# Patient Record
Sex: FEMALE | Race: WHITE
[De-identification: names, ages, dates, MRNs, and addresses within clinical notes are randomized per-mention and may not be internally consistent; named-entity substitution may affect disease eponyms.]

---

## 2017-04-25 ENCOUNTER — HOSPITAL ENCOUNTER (EMERGENCY)
Dept: HOSPITAL 47 - EC | Age: 52
Discharge: HOME | End: 2017-04-25
Payer: MEDICARE

## 2017-04-25 VITALS — TEMPERATURE: 98.8 F | RESPIRATION RATE: 18 BRPM

## 2017-04-25 VITALS — HEART RATE: 68 BPM

## 2017-04-25 DIAGNOSIS — Z88.8: ICD-10-CM

## 2017-04-25 DIAGNOSIS — K94.29: Primary | ICD-10-CM

## 2017-04-25 DIAGNOSIS — F32.9: ICD-10-CM

## 2017-04-25 DIAGNOSIS — G80.9: ICD-10-CM

## 2017-04-25 DIAGNOSIS — Z79.899: ICD-10-CM

## 2017-04-25 DIAGNOSIS — Z88.0: ICD-10-CM

## 2017-04-25 DIAGNOSIS — K21.9: ICD-10-CM

## 2017-04-25 PROCEDURE — 43760: CPT

## 2017-04-25 PROCEDURE — 99283 EMERGENCY DEPT VISIT LOW MDM: CPT

## 2017-04-25 NOTE — ED
Recheck HPI





- General


Chief Complaint: Recheck/Abnormal Lab/Rx


Stated Complaint: Feeding tube replacement


Time Seen by Provider: 04/25/17 19:55


Source: patient, EMS, RN notes reviewed


Mode of arrival: EMS


Limitations: no limitations





- History of Present Illness


Initial Comments: 





Patient is a 52-year-old female presents to the emergency room for evaluation 

of PEG tube replacement.  Patient has a history of cerebral palsy.  Patient's 

caregiver states that patient occasionally will pop out her PEG tube because 

she contracts her abdominal muscles so often.  Patient's caregiver states that 

patient PEG tube was originally placed in 2015 by Dr. Schultz.  Patient's 

caregiver states the patient's PEG tube is replaced every few months due to her 

PEG tube popping out.  Patient's caregiver denies any other complaints.  Patient

's caretaker states that she needs patient's PEG tube to be replaced.





- Related Data


 Home Medications











 Medication  Instructions  Recorded  Confirmed


 


Baclofen [Lioresal] 10 mg PO BID 05/19/14 04/25/17


 


Dantrolene [Dantrium] 25 mg PO QID 05/19/14 04/25/17


 


Topiramate [Topamax] 100 mg PO HS 05/19/14 04/25/17


 


Diazepam [Valium] 5 mg PO QID 05/20/14 04/25/17


 


Omeprazole [PriLOSEC] 20 mg PO BID 04/15/15 04/25/17


 


ARIPiprazole [Abilify Oral 2 mg PO BID 04/25/17 04/25/17





Solution]   


 


Baclofen [Lioresal] 20 mg PO HS 04/25/17 04/25/17


 


Benztropine Mesylate [Cogentin] 1 mg PO TID 04/25/17 04/25/17


 


Celexa Oral Solution 40 mg PO HS 04/25/17 04/25/17


 


Cholecalciferol [Vitamin D3] 2,000 unit PO DAILY 04/25/17 04/25/17


 


Diazepam [Valium] 2 mg PO TID 04/25/17 04/25/17


 


Natures Bounty Menopause 1 tab PO QAM 04/25/17 04/25/17


 


Vitamin E (Dl,Tocopheryl Acet) 800 unit PO DAILY 04/25/17 04/25/17





[Vitamin E]   











 Allergies











Allergy/AdvReac Type Severity Reaction Status Date / Time


 


amoxicillin Allergy  Rash/Hives Verified 04/25/17 20:12


 


risperidone [From Risperdal] AdvReac  Tardive Verified 04/25/17 20:12





   Dyskinesia  














Review of Systems


ROS Statement: 


Those systems with pertinent positive or pertinent negative responses have been 

documented in the HPI.





ROS Other: All systems not noted in ROS Statement are negative.





Past Medical History


Past Medical History: GERD/Reflux, Pneumonia


Additional Past Medical History / Comment(s): migraines, UTI, .CEREBRAL PALSY.


History of Any Multi-Drug Resistant Organisms: None Reported


Past Surgical History: Appendectomy, Hysterectomy


Additional Past Surgical History / Comment(s): cerebral palsy, congenital quad. 

temeors


Past Anesthesia/Blood Transfusion Reactions: No Reported Reaction


Past Psychological History: Depression


Additional Psychological History / Comment(s): pt lives at [facility] on 

[facility]. pt is total care. in last 3 months abilty to communicate has 

decreased, diff swallowing and decreased appetite. pt is total care but able to 

sit in w/c once transfered there. family was able to prompt pt w/question and 

get head gestures for responses.


Smoking Status: Never smoker


Past Alcohol Use History: None Reported


Past Drug Use History: None Reported





- Past Family History


  ** Father


Family Medical History: Asthma





  ** Mother


Family Medical History: COPD





General Exam





- General Exam Comments


Initial Comments: 





Pain exam room, no acute distress.


Limitations: no limitations


General appearance: alert, in no apparent distress


Head exam: Present: atraumatic, normocephalic, normal inspection


Eye exam: Present: normal appearance


ENT exam: Present: normal exam


Neck exam: Present: normal inspection


Respiratory exam: Present: normal lung sounds bilaterally.  Absent: respiratory 

distress


Cardiovascular Exam: Present: regular rate, normal rhythm, normal heart sounds


GI/Abdominal exam: Present: soft, normal bowel sounds, other (ostomy placed in 

RUQ where PEG tube is supposed to be placed ).  Absent: distended, tenderness, 

guarding, rebound, rigid


Extremities exam: Present: normal inspection


Back exam: Present: normal inspection


Neurological exam: Present: alert


Skin exam: Present: warm, dry, intact, normal color.  Absent: rash





Course


 Vital Signs











  04/25/17 04/25/17 04/25/17





  19:54 21:14 22:12


 


Temperature 98.8 F  


 


Pulse Rate 65 65 68


 


Respiratory 18  18





Rate   


 


O2 Sat by Pulse 95 96 96





Oximetry   














Procedures





- Feeding Tube Replacement


Reason for Replacement: fell out


Initial Tube Inserted: greater than 2 weeks


Type of Tube: gastrostomy


Use of Tube: medications and feeding


Insertion Site Prior to Procedure: clean


Tube Used for Reinsertion: Bard


Sami Tube Size (F): 16


Balloon Size (mls): 20


Verification of Placement: other (gastic fluid )


Tube Secured by: tape/dressing


Patient Tolerated Procedure: well


Complications: local bleeding





Medical Decision Making





- Medical Decision Making





Patient is a 52-year-old female presents to the emergency room for evaluation 

of feeding tube replacement.  Feeding tube was replaced successfully.  Advised 

patient caregiver to have patient follow-up with her primary care provider.  

Patient's caregiver states she understands everything that was discussed with 

her.  Return parameters discussed.  Case discussed with Dr. Neri.





Disposition


Clinical Impression: 


 Encounter for feeding tube placement





Disposition: HOME SELF-CARE


Condition: Good


Instructions:  How to Use and Care for Your PEG Tube (ED)


Additional Instructions: 


Please follow up with primary care provider in 1-2 days. If any new symptom 

arises or symptoms worsen, return to ER as soon as possible.  


Referrals: 


Austin Cruz Jr, DO [Primary Care Provider] - 1-2 days


Time of Disposition: 22:01

## 2017-05-09 ENCOUNTER — HOSPITAL ENCOUNTER (OUTPATIENT)
Dept: HOSPITAL 47 - SLEEP | Age: 52
End: 2017-05-09
Payer: MEDICARE

## 2017-05-09 DIAGNOSIS — Z79.899: ICD-10-CM

## 2017-05-09 DIAGNOSIS — G47.00: Primary | ICD-10-CM

## 2017-05-09 DIAGNOSIS — F32.9: ICD-10-CM

## 2017-05-09 DIAGNOSIS — G82.50: ICD-10-CM

## 2017-05-09 DIAGNOSIS — G80.9: ICD-10-CM

## 2017-05-09 PROCEDURE — 99204 OFFICE O/P NEW MOD 45 MIN: CPT

## 2017-05-09 PROCEDURE — 99211 OFF/OP EST MAY X REQ PHY/QHP: CPT

## 2017-05-09 NOTE — CONS
DATE OF CONSULTATION:  



REASON FOR CONSULTATION:  Insomnia.



This is a 52-year-old female patient with history of cerebral palsy 

and quadriplegia. The patient is currently living at [facility]. Approximately 3 to 4 years ago, the patient suffered from major 

depression disorder with psychotic features. During the same time, the 

patient start having locked jaw, clenching problems, jaw clenching 

problems and symptoms of tardive dyskinesia that was attributed to 

initiation of Risperidone treatment. The patient has been seen by 

various consultants, including Dr. Galindo from neurology, Dr. Alaina Jerry from [facility].   Looking at her 

medications, the patient is on a combination of medication which 

includes muscle relaxers and this include baclofen,  

(    ) and Valium and she is taking Valium 5 mg 4 times a day and 2 mg 

3 times a day. She is also on Detrol 25 mg 4 times a day, baclofen 10 

mg 1 to 4 tablets a day. Reviewing of psychiatric medications,  the 

patient is on a combination of citalopram and Abilify for depression, 

Topamax for a mood stabilizer and she is also on Benzotropine 1 mg 3 

times a day.  



It was noted that over the past year, the patient has been waking up 

in the middle of the night and she would stay awake for a few hours. 

The patient is unable to provide any history, however, she can 

communicate by using various codes and the caregivers are able to 

decipher the codes. She is quiet restlessness. She keeps moving and 

having upper extremity and trunk and neck movement even while she is 

sitting at her wheelchair.  It was noted that the patient also 

occasionally clenches her teeth and clenches her jaw.   I reviewed her 

sleep diary. It seems that the patient is able to go sleep at around 7 

to 9:00 p.m. However, she would wake up for a few hours between 

midnight and 2 to 3:00 a.m.  Does not seem to be any problem with 

sleep induction. She would wake up in the middle of the night and then 

she is able to go back to sleep following that. No reported snoring. 

No reported aspiration. The patient utilizes a feeding tube for 

enteral feeding and nutritional support. No crying episodes. She has 

chronic arthritic pain in her knee. She sleeps in the bed at 45 

degrees elevation. The main activation for now is sleep fragmentation. 

No agitation. No nocturnal seizures.  



PAST MEDICAL HISTORY: 

1. Congenital quadriplegia with cerebral palsy. 

2. Major depression disorder with severe psychotic features, treated. 

3. Questionable tardive dyskinesia related to risperidone. 

4. Locked jaw with clenching of the teeth, improved. 

5. Difficulty with swallowing and the patient has enteral feeding for 

nutritional support.  

6. Chronic right hip fracture. 



Surgical history:  Non- drug allergies are listed.



Outpatient medication list: 

1. The patient takes baclofen 10 mg  one in the morning, one at noon 

time and 2 in the evening. 

2. Ventolin 25 mg 4 times a day.

3. Omeprazole 20 mg twice a day.

4. Valium 2 mg one in the morning, one in the afternoon and one at 

nighttime. 

5. Valium 5 mg 4 times a day. 

6. She is also on Abilify 1 mg one in the morning and one in the 

evening. 

7. Citalopram 40 mg p.o. daily. 

8. Topamax 100 mg p.o. at the bedtime.

9. Benzotropine 1 mg 3 times a day.

10. Vitamin E two tablets 400 units, two tablets a day.

11. Vitamin D3, 2000 units 1 tablet a day. 

12. PEG tube feeding for nutritional support. 



SOCIAL HISTORY: She lives in a group home. No history of substance 

abuse.  



FAMILY HISTORY: Noncontributory at this stage.  Negative for any sleep 

breathing disorder.  



REVIEW OF SYSTEMS: Twelve-point review of systems cannot be obtained 

based on above mentioned limitation caused by her underlying cerebral 

palsy.  



PHYSICAL EXAMINATION: Blood pressure is 109/72, pulse 106, 

respirations 20, temperature 97.6, saturation 96% on room air. Weight 

is 101. Height is 5 foot 10. Neck size 15 inches.  

GENERAL APPEARANCE:  Calm and comfortable.  Some ongoing movement 

disorder both in the upper and lower extremities. The patient can 

sometimes communicate with a few words and she is able to state codes 

which is further deciphered by the caregivers.  

HEENT: Negative for goiter or neck masses.  Obvious stiffness in her 

neck area and jaw bilaterally along with evidence of teeth grinding. 

Posterior pharynx cannot be accurately inspected.  

LUNGS: Clear to auscultation. 

HEART: Sounds are normal S1, S2. No S3, no S4. No murmurs. 

ABDOMEN: Soft, nontender. No organomegaly. 

EXTREMITIES: No clubbing, no cyanosis, or clubbing. 



IMPRESSION:

1. Sleep maintenance insomnia. Rule out secondary to underlying 

depression/psychiatric disorder. Rule out other comorbidities, 

including the possibility of nocturnal pain, heartburn, reflux. Doubt  

obstructive sleep apnea.  Doubt any other restless leg syndrome. Doubt 

any other (    ) conditions such as nocturnal seizures or any other 

parasomnias.  

2. Cerebral palsy. 

3. (    ) quadriplegia. 

4. Questionable history of tardive dyskinesia. 

5. Difficulty with swallowing status post enteral feedings for 

nutritional support via PEG tube.  

6. Chronic pain involving the right hip related to her chronic 

fracture.  

7. Depression with psychotic features, history of. 



PLAN: I had a lengthy discussion with the family. I do not see 

evidence of any primary sleep disorder and her insomnia is most likely 

secondary to underlying neuropsychiatric condition. Depression is 

known to cause sleep fragmentation (   ) arousals and sometimes 

difficulty in sleep initiation. As mentioned, the primary sleep 

disorders are felt to be less likely in this patient. In fact, I am 

unable to complete the home sleep study because of her cerebral palsy 

and her underlying health condition.  



The treatment options that I have discussed with the patient are the 

followin. Consider addition of hypnotic agents such as Ambien 10 mg; however, 

this will likely give the patient a minimum benefit and in fact, will 

be quite slow knowing that she is taking higher doses of 

benzodiazepines and psychotropic medications. It is worthwhile (    ) 

the patient was given 10 mg of Ambien to be taken at bedtime and we 

will monitor her sleep schedule and sleep diary will be kept.  

2. Other alternatives that were discussed are the following:  

Increasing the dose of benzodiazepine and the patient is already on 

Valium and my recommendations is gradually up on the dose of Valium 

which will help her with improving anxiety levels and promote sleep. 

Increasing the nighttime Valium may be a consideration and suggesting 

treating the nighttime dose of Valium to 10 mg. This will be discussed 

further with Oralia Jerry from [facility]  to see 

if this is a viable option.  

3. One other suggestion will be the addition of Seroquel however, I 

would like to get a clearance from psychiatry prior to consider 

initiation of Seroquel which will help this patient with psychotic 

features and promote sleep.  

4. Consider the addition of trazodone at bedtime; however, this may 

interact with other medications including Abilify and citalopram and 

this will increase further the risk of serotonergic syndrome and other 

potential side effects. This option will be also discussed with 

psychiatry prior to being implemented.  

5. Other comorbidities include chronic pain that needs to be treated 

more effectively. Addition of a low dose Norco such as at a dose of 

5/325 at bedtime may help her control the pain and promote sleep.  

6. Continue omeprazole and implement aspiration precautions to provide 

any nocturnal reflux that could be potentially affecting her sleep 

quality.  

7. We will continue following up this patient. We will make further 

recommendations based on her overall progress.

## 2017-08-06 ENCOUNTER — HOSPITAL ENCOUNTER (EMERGENCY)
Dept: HOSPITAL 47 - EC | Age: 52
Discharge: HOME | End: 2017-08-06
Payer: MEDICARE

## 2017-08-06 VITALS — RESPIRATION RATE: 22 BRPM

## 2017-08-06 VITALS — TEMPERATURE: 97.3 F | HEART RATE: 59 BPM

## 2017-08-06 DIAGNOSIS — Z88.8: ICD-10-CM

## 2017-08-06 DIAGNOSIS — Z88.0: ICD-10-CM

## 2017-08-06 DIAGNOSIS — Z90.49: ICD-10-CM

## 2017-08-06 DIAGNOSIS — K21.9: ICD-10-CM

## 2017-08-06 DIAGNOSIS — Z79.899: ICD-10-CM

## 2017-08-06 DIAGNOSIS — K94.23: Primary | ICD-10-CM

## 2017-08-06 PROCEDURE — 99283 EMERGENCY DEPT VISIT LOW MDM: CPT

## 2017-08-06 PROCEDURE — 43760: CPT

## 2017-08-06 PROCEDURE — 74000: CPT

## 2017-08-06 NOTE — XR
EXAMINATION TYPE: XR KUB , ONE VIEW

 

DATE OF EXAM ORDERED: 8/6/2017

 

HISTORY: PEG tube placement.

 

COMPARISON: None.

 

FINDINGS:  Contrast injected into the patient's PEG tube. Gastric rugae are visualized. This confirms
 the presence of the tube within the stomach.

 

IMPRESSION: 

 

PEG TUBE IS NORMALLY LOCATED WITHIN THE STOMACH.

## 2017-08-06 NOTE — ED
General Adult HPI





- General


Chief complaint: Recheck/Abnormal Lab/Rx


Stated complaint: feeding tube


Time Seen by Provider: 08/06/17 11:56


Source: patient, EMS, RN notes reviewed


Mode of arrival: EMS


Limitations: altered mental status





- History of Present Illness


Initial comments: 





52-year-old female presented emergency from EMS for PEG tube displacement.  

Patient's had a feeding tube for several years PEG tube has fallen out today.  

Patient is in no distress.  No fevers or chills patient is here with caregivers.





- Related Data


 Home Medications











 Medication  Instructions  Recorded  Confirmed


 


Baclofen [Lioresal] 10 mg PO BID 05/19/14 04/25/17


 


Dantrolene [Dantrium] 25 mg PO QID 05/19/14 04/25/17


 


Topiramate [Topamax] 100 mg PO HS 05/19/14 04/25/17


 


Diazepam [Valium] 5 mg PO QID 05/20/14 04/25/17


 


Omeprazole [PriLOSEC] 20 mg PO BID 04/15/15 04/25/17


 


ARIPiprazole [Abilify Oral 2 mg PO BID 04/25/17 04/25/17





Solution]   


 


Baclofen [Lioresal] 20 mg PO HS 04/25/17 04/25/17


 


Benztropine Mesylate [Cogentin] 1 mg PO TID 04/25/17 04/25/17


 


Celexa Oral Solution 40 mg PO HS 04/25/17 04/25/17


 


Cholecalciferol [Vitamin D3] 2,000 unit PO DAILY 04/25/17 04/25/17


 


Diazepam [Valium] 2 mg PO TID 04/25/17 04/25/17


 


Natures Bounty Menopause 1 tab PO QAM 04/25/17 04/25/17


 


Vitamin E (Dl,Tocopheryl Acet) 800 unit PO DAILY 04/25/17 04/25/17





[Vitamin E]   











 Allergies











Allergy/AdvReac Type Severity Reaction Status Date / Time


 


amoxicillin Allergy  Rash/Hives Verified 04/25/17 20:12


 


risperidone [From Risperdal] AdvReac  Tardive Verified 04/25/17 20:12





   Dyskinesia  














Review of Systems


ROS Statement: 


Those systems with pertinent positive or pertinent negative responses have been 

documented in the HPI.





ROS Other: All systems not noted in ROS Statement are negative.





Past Medical History


Past Medical History: GERD/Reflux, Pneumonia


Additional Past Medical History / Comment(s): migraines, UTI, .CEREBRAL PALSY.


History of Any Multi-Drug Resistant Organisms: None Reported


Past Surgical History: Appendectomy, Hysterectomy


Additional Past Surgical History / Comment(s): cerebral palsy, congenital quad. 

temeors


Past Anesthesia/Blood Transfusion Reactions: No Reported Reaction


Past Psychological History: No Psychological Hx Reported, Depression


Smoking Status: Never smoker


Past Alcohol Use History: None Reported


Past Drug Use History: None Reported





- Past Family History


  ** Father


Family Medical History: Asthma





  ** Mother


Family Medical History: COPD





General Exam


Limitations: altered mental status


General appearance: alert, in no apparent distress


Neck exam: Present: normal inspection.  Absent: tenderness, meningismus, 

lymphadenopathy


Respiratory exam: Present: normal lung sounds bilaterally.  Absent: respiratory 

distress, wheezes, rales, rhonchi, stridor


Cardiovascular Exam: Present: regular rate, normal rhythm, normal heart sounds.

  Absent: systolic murmur, diastolic murmur, rubs, gallop, clicks


GI/Abdominal exam: Present: soft, normal bowel sounds, other (Opening noted for 

PEG tube with no erythema no purulent drainage).  Absent: distended, tenderness

, guarding, rebound, rigid





Course


 Vital Signs











  08/06/17





  11:46


 


Temperature 97.1 F L


 


Pulse Rate 54 L


 


Respiratory 22





Rate 


 


O2 Sat by Pulse 95





Oximetry 














Procedures





- Procedures


Initial comment: 





PEG tube placement: #20-Serbian PEG tube was placed with no complications 20 mL 

of saline were completed into the balloon Estephania was performed.





Disposition


Clinical Impression: 


 Dislodged gastrostomy tube, PEG tube malfunction





Disposition: HOME SELF-CARE


Condition: Stable


Instructions:  Percutaneous Endoscopic Gastrostomy (ED)


Additional Instructions: 


Please return to the Emergency Department if symptoms worsen or any other 

concerns.


Referrals: 


Austin Cruz Jr, DO [Primary Care Provider] - 1-2 days

## 2018-02-07 ENCOUNTER — HOSPITAL ENCOUNTER (EMERGENCY)
Dept: HOSPITAL 47 - EC | Age: 53
Discharge: HOME | End: 2018-02-07
Payer: MEDICARE

## 2018-02-07 VITALS — HEART RATE: 88 BPM | TEMPERATURE: 97.5 F | RESPIRATION RATE: 24 BRPM

## 2018-02-07 DIAGNOSIS — R41.82: ICD-10-CM

## 2018-02-07 DIAGNOSIS — Z79.899: ICD-10-CM

## 2018-02-07 DIAGNOSIS — Z88.0: ICD-10-CM

## 2018-02-07 DIAGNOSIS — Z88.8: ICD-10-CM

## 2018-02-07 DIAGNOSIS — F32.9: ICD-10-CM

## 2018-02-07 DIAGNOSIS — Z86.69: ICD-10-CM

## 2018-02-07 DIAGNOSIS — G80.9: ICD-10-CM

## 2018-02-07 DIAGNOSIS — K94.23: Primary | ICD-10-CM

## 2018-02-07 DIAGNOSIS — K21.9: ICD-10-CM

## 2018-02-07 PROCEDURE — 43760: CPT

## 2018-02-07 PROCEDURE — 99283 EMERGENCY DEPT VISIT LOW MDM: CPT

## 2018-02-07 PROCEDURE — 74018 RADEX ABDOMEN 1 VIEW: CPT

## 2018-02-07 NOTE — ED
General Adult HPI





- General


Chief complaint: Recheck/Abnormal Lab/Rx


Stated complaint: Peg Tube issues


Time Seen by Provider: 02/07/18 11:51


Source: patient, RN notes reviewed, Caregiver


Mode of arrival: wheelchair


Limitations: altered mental status, physical limitation





- History of Present Illness


Initial comments: 





This a 53-year-old female presents with caregiver for PEG tube complications.  

They reported her PEG tube is sliding out.  They noted noticed that seen at the 

bulb has ruptured.  She's had this happen several times in the past.  Patient 

is nonverbal, has history of cerebral palsy.  Caregiver reports no other 

complaints at this time.





- Related Data


 Home Medications











 Medication  Instructions  Recorded  Confirmed


 


Dantrolene [Dantrium] 25 mg PO QID 05/19/14 02/07/18


 


Topiramate [Topamax] 100 mg PO HS 05/19/14 02/07/18


 


Diazepam [Valium] 5 mg PO QID 05/20/14 02/07/18


 


Omeprazole [PriLOSEC] 20 mg PO BID 04/15/15 02/07/18


 


ARIPiprazole [Abilify Oral 2 mg PO BID 04/25/17 02/07/18





Solution]   


 


Baclofen [Lioresal] 20 mg PO TID 04/25/17 02/07/18


 


Benztropine Mesylate [Cogentin] 1 mg PO TID 04/25/17 02/07/18


 


Cholecalciferol [Vitamin D3] 2,000 unit PO DAILY 04/25/17 02/07/18


 


Diazepam [Valium] 2 mg PO TID 04/25/17 02/07/18


 


Vitamin E (Dl,Tocopheryl Acet) 800 unit PO DAILY 04/25/17 02/07/18





[Vitamin E]   


 


Docusate [Colace] 100 mg PO BID 11/08/17 02/07/18


 


Citalopram Hydrobromide [CeleXA] 40 mg PO HS 02/07/18 02/07/18











 Allergies











Allergy/AdvReac Type Severity Reaction Status Date / Time


 


amoxicillin Allergy  Rash/Hives Verified 02/07/18 11:52


 


risperidone [From Risperdal] AdvReac  Tardive Verified 02/07/18 11:52





   Dyskinesia  














Review of Systems


ROS Statement: 


Those systems with pertinent positive or pertinent negative responses have been 

documented in the HPI.





ROS Other: All systems not noted in ROS Statement are negative.





Past Medical History


Past Medical History: GERD/Reflux, Pneumonia


Additional Past Medical History / Comment(s): migraines, UTI, .CEREBRAL PALSY.


History of Any Multi-Drug Resistant Organisms: None Reported


Past Surgical History: Appendectomy, Hysterectomy


Additional Past Surgical History / Comment(s): cerebral palsy, congenital quad. 

temeors


Past Anesthesia/Blood Transfusion Reactions: No Reported Reaction


Past Psychological History: No Psychological Hx Reported, Depression


Smoking Status: Never smoker


Past Alcohol Use History: None Reported


Past Drug Use History: None Reported





- Past Family History


  ** Father


Family Medical History: Asthma





  ** Mother


Family Medical History: COPD





General Exam


Limitations: altered mental status, physical limitation


General appearance: alert, in no apparent distress


Respiratory exam: Present: normal lung sounds bilaterally.  Absent: respiratory 

distress, wheezes, rales, rhonchi, stridor


Cardiovascular Exam: Present: regular rate, normal rhythm, normal heart sounds.

  Absent: systolic murmur, diastolic murmur, rubs, gallop, clicks


GI/Abdominal exam: Present: soft, normal bowel sounds, other (PEG tube noted, 

appears to be dislodged).  Absent: distended, tenderness, guarding, rebound, 

rigid





Course


 Vital Signs











  02/07/18





  11:34


 


Temperature 97.5 F L


 


Pulse Rate 88


 


Respiratory 24





Rate 














Procedures





- Feeding Tube Replacement


Reason for Replacement: fell out


Initial Tube Inserted: greater than 2 weeks (Several years)


Type of Tube: gastrostomy


Use of Tube: medications and feeding


Insertion Site Prior to Procedure: clean


Tube Used for Reinsertion: other (20-[place] gastric tube)


[place] Tube Size (F): 20


Balloon Size (mls): 20


Verification of Placement: gastrografin injection


Tube Secured by: tape/dressing


Patient Tolerated Procedure: well, no complications





Medical Decision Making





- Medical Decision Making





53-year-old female presented for PEG tube dislodgment.  Patient's PEG tube was 

replaced Gastrografin x-ray was obtained which appears satisfaction of 

placement.  Patient will be discharged at this time.





Disposition


Clinical Impression: 


 PEG tube malfunction





Disposition: HOME SELF-CARE


Condition: Stable


Instructions:  Percutaneous Endoscopic Gastrostomy (ED)


Additional Instructions: 


Please return to the Emergency Department if symptoms worsen or any other 

concerns.


Referrals: 


Austin Cruz Jr,  [Primary Care Provider] - 1-2 days


Time of Disposition: 12:47

## 2018-02-07 NOTE — XR
EXAMINATION TYPE: XR KUB

 

DATE OF EXAM: 2/7/2018 12:43 PM

 

CLINICAL HISTORY:  PEG tube placement

 

TECHNIQUE: Single supine KUB image of the abdomen is obtained.

 

COMPARISON: 11/8/2017.

 

FINDINGS: 20 cc of Omnipaque 350 was injected into the percutaneous enteric gastric tube ensuring nick
ropriate placement of the tube. Post instillation imaging demonstrates contrast within the gastric muna
men and proximal small bowel. Gaseous distended large bowel loops are seen decreased from the prior e
xam as well as extensive degenerative changes of the right femoral acetabular joint.

 

IMPRESSION:

1. Appropriately placed percutaneous enteric gastric tube.

2. 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

## 2018-04-13 ENCOUNTER — HOSPITAL ENCOUNTER (EMERGENCY)
Dept: HOSPITAL 47 - EC | Age: 53
Discharge: HOME | End: 2018-04-13
Payer: MEDICARE

## 2018-04-13 VITALS — DIASTOLIC BLOOD PRESSURE: 100 MMHG | HEART RATE: 75 BPM | SYSTOLIC BLOOD PRESSURE: 150 MMHG | RESPIRATION RATE: 16 BRPM

## 2018-04-13 DIAGNOSIS — K94.23: ICD-10-CM

## 2018-04-13 DIAGNOSIS — Z79.899: ICD-10-CM

## 2018-04-13 DIAGNOSIS — G43.909: ICD-10-CM

## 2018-04-13 DIAGNOSIS — Z88.0: ICD-10-CM

## 2018-04-13 DIAGNOSIS — G24.01: Primary | ICD-10-CM

## 2018-04-13 DIAGNOSIS — K21.9: ICD-10-CM

## 2018-04-13 DIAGNOSIS — Z88.8: ICD-10-CM

## 2018-04-13 PROCEDURE — 43760: CPT

## 2018-04-13 PROCEDURE — 99284 EMERGENCY DEPT VISIT MOD MDM: CPT

## 2018-04-13 NOTE — ED
General Adult HPI





- General


Chief complaint: Recheck/Abnormal Lab/Rx


Stated complaint: Peg tube issue


Time Seen by Provider: 04/13/18 07:20


Source: EMS, RN notes reviewed, old records reviewed


Mode of arrival: EMS


Limitations: language barrier, altered mental status, physical limitation





- History of Present Illness


Initial comments: 





This is a 53-year-old female the ER for evaluation.  She was essay for 

evaluation regards to PEG tube dislodgment.  G-tube dislodgment.  Patient has 

had G-tube for multiple years and has recurrent dislocations.  Patient is a 

poor historian history obtained by patient's provider.  Patient was under 

extreme stress and to rest today due to came dislodged





- Related Data


 Home Medications











 Medication  Instructions  Recorded  Confirmed


 


Dantrolene [Dantrium] 25 mg PEG/G-TUBE QID 05/19/14 04/13/18


 


Topiramate [Topamax] 100 mg PEG/G-TUBE HS 05/19/14 04/13/18


 


Diazepam [Valium] 5 mg PEG/G-TUBE QID 05/20/14 04/13/18


 


Omeprazole [PriLOSEC] 20 mg PEG/G-TUBE BID 04/15/15 04/13/18


 


ARIPiprazole [Abilify Oral 2 mg PEG/G-TUBE BID 04/25/17 04/13/18





Solution]   


 


Baclofen [Lioresal] 20 mg PEG/G-TUBE TID 04/25/17 04/13/18


 


Benztropine Mesylate [Cogentin] 1 mg PEG/G-TUBE TID 04/25/17 04/13/18


 


Cholecalciferol [Vitamin D3] 2,000 unit PEG/G-TUBE DAILY 04/25/17 04/13/18


 


Diazepam [Valium] 2 mg PEG/G-TUBE TID@0800,1800,2100 04/25/17 04/13/18


 


Vitamin E (Dl,Tocopheryl Acet) 800 unit PEG/G-TUBE DAILY@1200 04/25/17 04/13/18





[Vitamin E]   


 


Docusate [Colace] 100 mg PEG/G-TUBE BID 11/08/17 04/13/18


 


Citalopram Hydrobromide [CeleXA] 40 mg PEG/G-TUBE HS 02/07/18 04/13/18











 Allergies











Allergy/AdvReac Type Severity Reaction Status Date / Time


 


amoxicillin Allergy  Rash/Hives Verified 04/13/18 07:59


 


risperidone [From Risperdal] AdvReac  Tardive Verified 04/13/18 07:59





   Dyskinesia  














Review of Systems


ROS Statement: 


Those systems with pertinent positive or pertinent negative responses have been 

documented in the HPI.





ROS Other: All systems not noted in ROS Statement are negative.





Past Medical History


Past Medical History: GERD/Reflux, Pneumonia


Additional Past Medical History / Comment(s): migraines, UTI, .CEREBRAL PALSY.


History of Any Multi-Drug Resistant Organisms: None Reported


Past Surgical History: Appendectomy, Hysterectomy


Additional Past Surgical History / Comment(s): cerebral palsy, congenital quad. 

temeors


Past Anesthesia/Blood Transfusion Reactions: No Reported Reaction


Past Psychological History: No Psychological Hx Reported, Depression


Smoking Status: Never smoker


Past Alcohol Use History: None Reported


Past Drug Use History: None Reported





- Past Family History


  ** Father


Family Medical History: Asthma





  ** Mother


Family Medical History: COPD





General Exam





- General Exam Comments


Initial Comments: 





Colostomy site with mild bleeding


Limitations: language barrier, altered mental status, physical limitation


General appearance: alert, in no apparent distress


Head exam: Present: atraumatic, normocephalic, normal inspection


Eye exam: Present: normal appearance, PERRL, EOMI.  Absent: scleral icterus, 

conjunctival injection, periorbital swelling


ENT exam: Present: normal exam, mucous membranes moist


Neck exam: Present: normal inspection.  Absent: tenderness, meningismus, 

lymphadenopathy


Respiratory exam: Present: normal lung sounds bilaterally.  Absent: respiratory 

distress, wheezes, rales, rhonchi, stridor


Cardiovascular Exam: Present: regular rate, normal rhythm, normal heart sounds.

  Absent: systolic murmur, diastolic murmur, rubs, gallop, clicks


GI/Abdominal exam: Present: soft, normal bowel sounds.  Absent: distended, 

tenderness, guarding, rebound, rigid


Extremities exam: Present: normal inspection, full ROM, normal capillary 

refill.  Absent: tenderness, pedal edema, joint swelling, calf tenderness


Back exam: Present: normal inspection


Neurological exam: Present: alert, oriented X3, CN II-XII intact


Psychiatric exam: Present: normal affect, normal mood


Skin exam: Present: warm, dry, intact, normal color.  Absent: rash





Course





 Vital Signs











  04/13/18 04/13/18





  06:14 06:52


 


Temperature 97.5 F L 


 


Pulse Rate 64 78


 


Blood Pressure  180/88


 


O2 Sat by Pulse 99 





Oximetry  














- Reevaluation(s)


Reevaluation #1: 





04/13/18 09:01


Failed to reintroduce 20-Romanian G-tube, attempt made with 22-Romanian





Medical Decision Making





- Medical Decision Making





53 female DEL with PEG tube dislodgment G-tube dislodgment.  Tube is replaced, 

patient to be discharged





Disposition


Clinical Impression: 


 Tardive dyskinesia, Gastrostomy tube dysfunction





Disposition: HOME SELF-CARE


Condition: Good


Instructions:  Percutaneous Endoscopic Gastrostomy (ED)


Referrals: 


Austin Cruz Jr, DO [Primary Care Provider] - 1-2 days

## 2018-07-06 ENCOUNTER — HOSPITAL ENCOUNTER (EMERGENCY)
Dept: HOSPITAL 47 - EC | Age: 53
Discharge: HOME | End: 2018-07-06
Payer: MEDICARE

## 2018-07-06 VITALS — RESPIRATION RATE: 20 BRPM | TEMPERATURE: 99.4 F

## 2018-07-06 VITALS — SYSTOLIC BLOOD PRESSURE: 159 MMHG | DIASTOLIC BLOOD PRESSURE: 105 MMHG

## 2018-07-06 VITALS — HEART RATE: 72 BPM

## 2018-07-06 DIAGNOSIS — Z88.0: ICD-10-CM

## 2018-07-06 DIAGNOSIS — Z79.899: ICD-10-CM

## 2018-07-06 DIAGNOSIS — K59.00: Primary | ICD-10-CM

## 2018-07-06 DIAGNOSIS — Z90.710: ICD-10-CM

## 2018-07-06 DIAGNOSIS — K21.9: ICD-10-CM

## 2018-07-06 DIAGNOSIS — G43.909: ICD-10-CM

## 2018-07-06 DIAGNOSIS — Z90.49: ICD-10-CM

## 2018-07-06 DIAGNOSIS — Z88.8: ICD-10-CM

## 2018-07-06 PROCEDURE — 99284 EMERGENCY DEPT VISIT MOD MDM: CPT

## 2018-07-06 PROCEDURE — 74018 RADEX ABDOMEN 1 VIEW: CPT

## 2018-07-06 NOTE — ED
Abdominal Pain HPI





- General


Chief Complaint: Abdominal Pain


Stated Complaint: Constipated


Time Seen by Provider: 07/06/18 20:37


Source: Caregiver


Mode of arrival: wheelchair


Limitations: no limitations





- History of Present Illness


Initial Comments: 





Patient is a 53-year-old female that is nonverbal secondary to cerebral palsy 

and presents with the patient's caregiver.  Caregiver states that the patient 

has not had a bowel movement in the last 7 days.  However, the patient is able 

to express when she is having discomfort and she is not currently expressing 

anything to suggest that she is having pain.  Caregiver also states the patient 

is not been having any fevers or chills or vomiting.  They did try to 

suppositories and enema but this is not helping his symptoms.





- Related Data


 Home Medications











 Medication  Instructions  Recorded  Confirmed


 


Dantrolene [Dantrium] 25 mg PEG/G-TUBE QID 05/19/14 07/06/18


 


Topiramate [Topamax] 100 mg PEG/G-TUBE HS 05/19/14 07/06/18


 


Diazepam [Valium] 5 mg PEG/G-TUBE QID 05/20/14 07/06/18


 


Omeprazole [PriLOSEC] 20 mg PEG/G-TUBE BID 04/15/15 07/06/18


 


ARIPiprazole [Abilify Oral 2 mg PEG/G-TUBE BID 04/25/17 07/06/18





Solution]   


 


Baclofen [Lioresal] 20 mg PEG/G-TUBE TID 04/25/17 07/06/18


 


Benztropine Mesylate [Cogentin] 1 mg PEG/G-TUBE TID 04/25/17 07/06/18


 


Cholecalciferol [Vitamin D3] 2,000 unit PEG/G-TUBE DAILY 04/25/17 07/06/18


 


Diazepam [Valium] 2 mg PEG/G-TUBE TID@0800,1800,2100 04/25/17 07/06/18


 


Vitamin E (Dl,Tocopheryl Acet) 800 unit PEG/G-TUBE DAILY@1200 04/25/17 07/06/18





[Vitamin E]   


 


Docusate [Colace] 100 mg PEG/G-TUBE BID 11/08/17 07/06/18


 


Citalopram Hydrobromide [CeleXA] 40 mg PEG/G-TUBE HS 02/07/18 07/06/18











 Allergies











Allergy/AdvReac Type Severity Reaction Status Date / Time


 


amoxicillin Allergy  Rash/Hives Verified 07/06/18 20:51


 


risperidone [From Risperdal] AdvReac  Tardive Verified 07/06/18 20:51





   Dyskinesia  














Review of Systems


ROS Statement: 


Those systems with pertinent positive or pertinent negative responses have been 

documented in the HPI.


Unable to obtained as the patient is nonverbal.


ROS Other: All systems not noted in ROS Statement are negative.





Past Medical History


Past Medical History: GERD/Reflux, Pneumonia


Additional Past Medical History / Comment(s): migraines, UTI, .CEREBRAL PALSY. 

PEG TUBE


History of Any Multi-Drug Resistant Organisms: None Reported


Past Surgical History: Appendectomy, Hysterectomy


Additional Past Surgical History / Comment(s): cerebral palsy, congenital quad. 

temeors


Past Anesthesia/Blood Transfusion Reactions: No Reported Reaction


Past Psychological History: No Psychological Hx Reported, Depression


Smoking Status: Never smoker


Past Alcohol Use History: None Reported


Past Drug Use History: None Reported





- Past Family History


  ** Father


Family Medical History: Asthma





  ** Mother


Family Medical History: COPD





General Exam





- General Exam Comments


Initial Comments: 





Constitutional: Patient is alert but unable to obtain a and O status secondary 

to CP. No distress.





HENT:





Head: Normocephalic and atraumatic.





Eyes: EOM are normal.





Neck: Normal range of motion. Neck supple.





Cardiovascular: Normal rate, regular rhythm, S1 normal, S2 normal and normal 

heart sounds.  Exam reveals no gallop and no friction rub. No murmur heard.





Pulmonary/Chest: Effort normal and breath sounds normal. No tachypnea and no 

bradypnea. No respiratory distress. No wheezes or rales noted.





Abdominal: Soft. Bowel sounds are normal. Pt exhibits no shifting dullness, no 

distension, no pulsatile liver, no fluid wave, no abdominal bruit and no 

ascites. There is no tenderness. There is no rigidity, no rebound, no guarding, 

no tenderness at McBurney's point and negative Patton's sign.





Musculoskeletal: Diffuse extremity contractures secondary to cerebral palsy





Neurological: Pt is alertNo cranial nerve deficit.





Skin: Skin is warm and dry. No rash noted. Pt is not diaphoretic. No erythema. 

No pallor.





Psychiatric: Unable to obtain secondary to cerebral palsy and patient's 

nonverbal status.


Limitations: no limitations





Course


 Vital Signs











  07/06/18 07/06/18 07/06/18





  20:23 21:46 22:59


 


Temperature 99.4 F  


 


Pulse Rate 66  72


 


Respiratory 20  20





Rate   


 


Blood Pressure  159/105 


 


O2 Sat by Pulse 97  97





Oximetry   














Medical Decision Making





- Medical Decision Making





KUB was performed and showed that there was possible constipation which is not 

significantly changed from the prior imaging.  Brother was now bedside and the 

utility of CAT scan was discussed.  His mutually agreed that based on the 

physical exam findings as well as the lack of symptoms that the patient was 

having, CT could be deferred.  Serial abdominal exams revealed that the patient 

did not elicit any signs of pain when palpated and patient was able to respond 

by nodding her head and performing verbal cues and asked that there is pain in 

her responses were consistent with her not having any pain.  Because of this, 

it was felt that the patient could be treated medically and therefore was given 

magnesium citrate, Dulcolax and lactulose.  Family and caregiver were strongly 

advised to follow up with PCP in the next 1-2 days and/or return to emergency 

department if symptoms worsened or continue.  They were agreeable to plan.





Disposition


Clinical Impression: 


 Constipation





Disposition: HOME SELF-CARE


Condition: Good


Instructions:  Constipation (ED)


Is patient prescribed a controlled substance at d/c from ED?: No


Referrals: 


Austin Cruz Jr, DO [Primary Care Provider] - 1-2 days


Time of Disposition: 22:34

## 2018-07-06 NOTE — XR
EXAMINATION TYPE: XR KUB

 

DATE OF EXAM: 7/6/2018

 

COMPARISON: 2/7/2018

 

HISTORY: Constipation. PEG tube

 

TECHNIQUE: 2 views

 

FINDINGS: There is a peg tube that appears to be in good position over the stomach. There is no sign 
of intestinal obstruction or pneumoperitoneum. There is some retained fecal material in the right and
 left colon.. There is significant arthritic change at the right hip joint. There is no evidence of a
 mass.

 

IMPRESSION: Nonacute abdomen. There is probably some constipation. This is the same or worse than old
 exam.

## 2018-10-04 ENCOUNTER — HOSPITAL ENCOUNTER (OUTPATIENT)
Dept: HOSPITAL 47 - LABWHC1 | Age: 53
Discharge: HOME | End: 2018-10-04
Attending: PSYCHIATRY & NEUROLOGY
Payer: MEDICARE

## 2018-10-04 DIAGNOSIS — G43.709: ICD-10-CM

## 2018-10-04 DIAGNOSIS — G80.9: Primary | ICD-10-CM

## 2018-10-04 LAB
ALBUMIN SERPL-MCNC: 4.4 G/DL (ref 3.5–5)
ALP SERPL-CCNC: 121 U/L (ref 38–126)
ALT SERPL-CCNC: 26 U/L (ref 9–52)
ANION GAP SERPL CALC-SCNC: 7 MMOL/L
AST SERPL-CCNC: 24 U/L (ref 14–36)
BUN SERPL-SCNC: 23 MG/DL (ref 7–17)
CALCIUM SPEC-MCNC: 10.3 MG/DL (ref 8.4–10.2)
CHLORIDE SERPL-SCNC: 110 MMOL/L (ref 98–107)
CO2 SERPL-SCNC: 27 MMOL/L (ref 22–30)
ERYTHROCYTE [DISTWIDTH] IN BLOOD BY AUTOMATED COUNT: 4.45 M/UL (ref 3.8–5.4)
ERYTHROCYTE [DISTWIDTH] IN BLOOD: 13.5 % (ref 11.5–15.5)
GLUCOSE SERPL-MCNC: 88 MG/DL (ref 74–99)
HCT VFR BLD AUTO: 45.5 % (ref 34–46)
HGB BLD-MCNC: 14 GM/DL (ref 11.4–16)
MCH RBC QN AUTO: 31.5 PG (ref 25–35)
MCHC RBC AUTO-ENTMCNC: 30.8 G/DL (ref 31–37)
MCV RBC AUTO: 102.3 FL (ref 80–100)
PLATELET # BLD AUTO: 178 K/UL (ref 150–450)
POTASSIUM SERPL-SCNC: 4.3 MMOL/L (ref 3.5–5.1)
PROT SERPL-MCNC: 7.6 G/DL (ref 6.3–8.2)
SODIUM SERPL-SCNC: 144 MMOL/L (ref 137–145)
T4 FREE SERPL-MCNC: 1.1 NG/DL (ref 0.78–2.19)
VIT B12 SERPL-MCNC: 969 PG/ML (ref 200–944)
WBC # BLD AUTO: 5.4 K/UL (ref 3.8–10.6)

## 2018-10-04 PROCEDURE — 80053 COMPREHEN METABOLIC PANEL: CPT

## 2018-10-04 PROCEDURE — 36415 COLL VENOUS BLD VENIPUNCTURE: CPT

## 2018-10-04 PROCEDURE — 82306 VITAMIN D 25 HYDROXY: CPT

## 2018-10-04 PROCEDURE — 84443 ASSAY THYROID STIM HORMONE: CPT

## 2018-10-04 PROCEDURE — 82607 VITAMIN B-12: CPT

## 2018-10-04 PROCEDURE — 85027 COMPLETE CBC AUTOMATED: CPT

## 2018-10-04 PROCEDURE — 84439 ASSAY OF FREE THYROXINE: CPT

## 2019-09-28 ENCOUNTER — HOSPITAL ENCOUNTER (OUTPATIENT)
Dept: HOSPITAL 47 - RADXRMAIN | Age: 54
Discharge: HOME | End: 2019-09-28
Attending: FAMILY MEDICINE
Payer: MEDICARE

## 2019-09-28 DIAGNOSIS — K59.39: Primary | ICD-10-CM

## 2019-09-28 DIAGNOSIS — Z93.1: ICD-10-CM

## 2019-09-28 PROCEDURE — 74022 RADEX COMPL AQT ABD SERIES: CPT

## 2019-09-28 NOTE — XR
EXAMINATION TYPE: XR abdomen acute w cxr , 3 VIEWS

 

DATE OF EXAM ORDERED: 9/28/2019

 

HISTORY: POSS PEG TUBE  DISLODGED.

 

COMPARISON: None.

 

FINDINGS:  There is elevation of the right hemidiaphragm and there is colonic interposition bilateral
ly. The lung bases are otherwise clear. The patient's pancreatic tube projects over the mid abdomen. 
There is a dilated loop of what appears to be colon in the midabdomen. No evidence of free air. There
 is marked degenerative change right hip.

 

IMPRESSION: 

1. PEG TUBE PROJECTS OVER THE MID ABDOMEN.

2. DILATED LOOP OF COLON IN THE MIDABDOMEN ON THE SUPINE VIEW.

## 2019-10-16 ENCOUNTER — HOSPITAL ENCOUNTER (OUTPATIENT)
Dept: HOSPITAL 47 - RADUSWWP | Age: 54
Discharge: HOME | End: 2019-10-16
Attending: INTERNAL MEDICINE
Payer: MEDICARE

## 2019-10-16 DIAGNOSIS — R10.9: ICD-10-CM

## 2019-10-16 DIAGNOSIS — N20.0: Primary | ICD-10-CM

## 2019-10-16 PROCEDURE — 76700 US EXAM ABDOM COMPLETE: CPT

## 2019-10-16 PROCEDURE — 49465 FLUORO EXAM OF G/COLON TUBE: CPT

## 2019-10-16 NOTE — US
EXAMINATION TYPE: US abdomen complete

 

DATE OF EXAM: 10/16/2019

 

COMPARISON: NONE

 

CLINICAL HISTORY: R10.9 abd pain. nonverbal cerebral palsy patient that was in constant motion with u
pper body and arms, caregiver stated abd pain, patient had peg tube in place

 

EXAM MEASUREMENTS:

 

Liver Length:  13.6 cm   

Gallbladder Wall:  0.2 cm   

CBD:  0.4 cm

Spleen:  7.4 cm   

Right Kidney:  8.4 x 3.8 x 4.2 cm 

Left Kidney:  9.5 x 3.9 x 5.6 cm   

 

**very limited exam due to motion of patient and inability to hold still, excessive bowel gas

 

Pancreas:  not seen, obscured by bowel gas

Liver:  left lobe not seen, limited intercostal imaging of right lobe appear wnl

Gallbladder:  fold seen, portion seen appears wnl

**Evidence for sonographic Patton's sign:  unknown

CBD:  wnl 

Spleen:  wnl   

Right Kidney:  limited views show probable medial stones = 1.9cm    

Left Kidney:  wnl   

Upper IVC:  wnl  

Abd Aorta:  unable to visualize due to reasons stated above

 

 

 

IMPRESSION: 

1. Nonobstructing right renal stone.

2. There is limitation of this examination.

## 2019-10-16 NOTE — FL
EXAMINATION TYPE: FL feeding tube

 

DATE OF EXAM: 10/16/2019

 

COMPARISON: None

 

HISTORY: Abdomen pain, R 10.9

 

TECHNIQUE: Fluoroscopy is performed during the injection of water-soluble contrast through a PEG tube
.

 

FINDINGS: Contrast enters the stomach. No extravasation is evident.

 

30 mL of Isovue-370 was utilized.

15 seconds of fluoroscopy time was utilized.

Images: 5

 

IMPRESSION:

1.  No extravasation of contrast through the PEG tube which empties into the stomach.

## 2020-02-02 ENCOUNTER — HOSPITAL ENCOUNTER (EMERGENCY)
Dept: HOSPITAL 47 - EC | Age: 55
Discharge: HOME | End: 2020-02-02
Payer: MEDICARE

## 2020-02-02 VITALS — TEMPERATURE: 97.5 F | HEART RATE: 75 BPM | RESPIRATION RATE: 18 BRPM

## 2020-02-02 DIAGNOSIS — K21.9: ICD-10-CM

## 2020-02-02 DIAGNOSIS — F32.9: ICD-10-CM

## 2020-02-02 DIAGNOSIS — Z79.899: ICD-10-CM

## 2020-02-02 DIAGNOSIS — G25.9: ICD-10-CM

## 2020-02-02 DIAGNOSIS — K94.23: Primary | ICD-10-CM

## 2020-02-02 DIAGNOSIS — G80.9: ICD-10-CM

## 2020-02-02 DIAGNOSIS — Z88.0: ICD-10-CM

## 2020-02-02 DIAGNOSIS — Z88.8: ICD-10-CM

## 2020-02-02 DIAGNOSIS — Z99.3: ICD-10-CM

## 2020-02-02 PROCEDURE — 99282 EMERGENCY DEPT VISIT SF MDM: CPT

## 2020-02-02 PROCEDURE — 43762 RPLC GTUBE NO REVJ TRC: CPT

## 2020-02-02 NOTE — ED
General Adult HPI





- General


Chief complaint: Recheck/Abnormal Lab/Rx


Stated complaint: Peg Tube Issue


Time Seen by Provider: 02/02/20 06:06


Source: patient


Mode of arrival: ambulatory


Limitations: physical limitation





- History of Present Illness


Initial comments: 


Ginny is a nonverbal 55-year-old female who is brought to the ER this morning 

after accidentally removing her PEG tube around 4:30 this morning.  PEG tube is 

in place for a number of years and she tends to pull it out about once every 8-

12 months.  Feedings were going well yesterday.  Patient is no acute distress 

there is no bleeding from the tube site.








- Related Data


                                Home Medications











 Medication  Instructions  Recorded  Confirmed


 


Dantrolene [Dantrium] 25 mg PEG/G-TUBE QID 05/19/14 07/06/18


 


Topiramate [Topamax] 100 mg PEG/G-TUBE HS 05/19/14 07/06/18


 


Diazepam [Valium] 5 mg PEG/G-TUBE QID 05/20/14 07/06/18


 


Omeprazole [PriLOSEC] 20 mg PEG/G-TUBE BID 04/15/15 07/06/18


 


ARIPiprazole [Abilify Oral 2 mg PEG/G-TUBE BID 04/25/17 07/06/18





Solution]   


 


Baclofen [Lioresal] 20 mg PEG/G-TUBE TID 04/25/17 07/06/18


 


Benztropine Mesylate [Cogentin] 1 mg PEG/G-TUBE TID 04/25/17 07/06/18


 


Cholecalciferol [Vitamin D3] 2,000 unit PEG/G-TUBE DAILY 04/25/17 07/06/18


 


Diazepam [Valium] 2 mg PEG/G-TUBE TID@0800,1800,2100 04/25/17 07/06/18


 


Vitamin E (Dl,Tocopheryl Acet) 800 unit PEG/G-TUBE DAILY@1200 04/25/17 07/06/18





[Vitamin E]   


 


Docusate [Colace] 100 mg PEG/G-TUBE BID 11/08/17 07/06/18


 


Citalopram Hydrobromide [CeleXA] 40 mg PEG/G-TUBE HS 02/07/18 07/06/18











                                    Allergies











Allergy/AdvReac Type Severity Reaction Status Date / Time


 


amoxicillin Allergy  Rash/Hives Verified 02/02/20 05:58


 


risperidone [From Risperdal] AdvReac  Tardive Verified 02/02/20 05:58





   Dyskinesia  














Review of Systems


ROS Statement: 


Those systems with pertinent positive or pertinent negative responses have been 

documented in the HPI.





ROS Other: All systems not noted in ROS Statement are negative.





Past Medical History


Past Medical History: GERD/Reflux, Pneumonia


Additional Past Medical History / Comment(s): migraines, UTI, .CEREBRAL PALSY. 

PEG TUBE


History of Any Multi-Drug Resistant Organisms: None Reported


Past Surgical History: Appendectomy, Hysterectomy


Additional Past Surgical History / Comment(s): cerebral palsy, congenital quad. 

temeors


Past Anesthesia/Blood Transfusion Reactions: No Reported Reaction


Past Psychological History: No Psychological Hx Reported, Depression


Smoking Status: Never smoker


Past Alcohol Use History: None Reported


Past Drug Use History: None Reported





- Past Family History


  ** Father


Family Medical History: Asthma





  ** Mother


Family Medical History: COPD





General Exam





- General Exam Comments


Initial Comments: 


Physical Exam


GENERAL:


Nonverbal contracted wheelchair-bound female 





HENT:


Normocephalic, Atraumatic. 





EYES:


PERRL, EOMI





PULMONARY:


Unlabored respirations.  





CARDIOVASCULAR:


RRR


Warm and well perfused extremities





ABDOMEN:


Non-distended, nontender


Very patient PEG tube site visible with no obvious bleeding or signs of trauma





SKIN:


No rashes or bruising 





: 


Deferred





NEUROLOGIC:


Nonverbal, contracted


Spastic movements of all extremities





MUSCULOSKELETAL:


Contracted extremities





PSYCHIATRIC:


Nonverbal





Limitations: physical limitation





Course


                                   Vital Signs











  02/02/20





  05:56


 


Temperature 97.5 F L


 


Pulse Rate 75


 


Respiratory 18





Rate 














Medical Decision Making





- Medical Decision Making


The patient was seen and evaluated history is obtained from caregiver at bedside

who is called at 4:30 this morning and advised that the patient had pulled out 

her PEG tube.  She is brought to the emergency department.  Patient is 

contracted wheelchair-bound she has spastic movements of all extremities and is 

non-cooperative with evaluation.  


HR was within normal limits however blood pressure and temperature were not 

checked because of patient's noncooperation with exam and no concern for illness

at this time





Caregiver uncertain of previous size of PEG tube however paperwork states that 

it was a 79-71-Qohf[place]





16-Spanish PEG tube was placed with out any comp patient's stomach contents were 

noted to drain from the PEG tube immediately, I offered the caregiver an x-ray 

to confirm placement however she states it has not been necessary in the past 

they know the PEG tube is written to place and sometimes he replaces been 

nursing home on their own.  This time the comfortable with plan for discharge 

home.








Disposition


Clinical Impression: 


 PEG tube malfunction





Disposition: HOME SELF-CARE


Condition: Stable


Instructions (If sedation given, give patient instructions):  PEG Tube Insertion

(DC)


Is patient prescribed a controlled substance at d/c from ED?: No


Referrals: 


Austin Cruz Jr,  [Primary Care Provider] - 1-2 days

## 2020-07-22 ENCOUNTER — HOSPITAL ENCOUNTER (EMERGENCY)
Age: 55
Discharge: HOME | End: 2020-07-22
Payer: MEDICARE

## 2020-07-22 PROCEDURE — 99282 EMERGENCY DEPT VISIT SF MDM: CPT

## 2020-07-22 PROCEDURE — 43762 RPLC GTUBE NO REVJ TRC: CPT

## 2020-07-22 NOTE — ED
General Adult HPI





- General


Chief complaint: Recheck/Abnormal Lab/Rx


Stated complaint: Peg tube issues


Time Seen by Provider: 07/22/20 15:20


Source: RN notes reviewed, Caregiver


Mode of arrival: wheelchair


Limitations: language barrier, physical limitation





- History of Present Illness


Initial comments: 





This is a 55-year-old female who has a PEG tube it is not communicative.  She 

apparently pulled out she has done this in the past.  Last time being in 

February of this year.  It happened about 245 today.  No fevers chills nausea 

vomiting sweats no other complaints at this time.  Looking at old records 

patient had a #16-Kazakh PEG tube placed last time





- Related Data


                                Home Medications











 Medication  Instructions  Recorded  Confirmed


 


Dantrolene [Dantrium] 25 mg PEG/G-TUBE QID 05/19/14 07/06/18


 


Topiramate [Topamax] 100 mg PEG/G-TUBE HS 05/19/14 07/06/18


 


diazePAM [Valium] 5 mg PEG/G-TUBE QID 05/20/14 07/06/18


 


Omeprazole [PriLOSEC] 20 mg PEG/G-TUBE BID 04/15/15 07/06/18


 


ARIPiprazole [Abilify Oral 2 mg PEG/G-TUBE BID 04/25/17 07/06/18





Solution]   


 


Baclofen [Lioresal] 20 mg PEG/G-TUBE TID 04/25/17 07/06/18


 


Benztropine Mesylate [Cogentin] 1 mg PEG/G-TUBE TID 04/25/17 07/06/18


 


Cholecalciferol [Vitamin D3] 2,000 unit PEG/G-TUBE DAILY 04/25/17 07/06/18


 


Vitamin E (Dl,Tocopheryl Acet) 800 unit PEG/G-TUBE DAILY@1200 04/25/17 07/06/18





[Vitamin E]   


 


diazePAM [Valium] 2 mg PEG/G-TUBE TID@0800,1800,2100 04/25/17 07/06/18


 


Docusate [Colace] 100 mg PEG/G-TUBE BID 11/08/17 07/06/18


 


Citalopram Hydrobromide [CeleXA] 40 mg PEG/G-TUBE HS 02/07/18 07/06/18











                                    Allergies











Allergy/AdvReac Type Severity Reaction Status Date / Time


 


amoxicillin Allergy  Rash/Hives Verified 02/02/20 05:58


 


risperidone [From Risperdal] AdvReac  Tardive Verified 02/02/20 05:58





   Dyskinesia  














Review of Systems


ROS Statement: 


Those systems with pertinent positive or pertinent negative responses have been 

documented in the HPI.





ROS Other: All systems not noted in ROS Statement are negative.





Past Medical History


Past Medical History: GERD/Reflux, Pneumonia


Additional Past Medical History / Comment(s): migraines, UTI, .CEREBRAL PALSY. 

PEG TUBE


History of Any Multi-Drug Resistant Organisms: None Reported


Past Surgical History: Appendectomy, Hysterectomy


Additional Past Surgical History / Comment(s): cerebral palsy, congenital quad. 

temeors


Past Anesthesia/Blood Transfusion Reactions: No Reported Reaction


Past Psychological History: No Psychological Hx Reported, Depression


Smoking Status: Never smoker


Past Alcohol Use History: None Reported


Past Drug Use History: None Reported





- Past Family History


  ** Father


Family Medical History: Asthma





  ** Mother


Family Medical History: COPD





General Exam





- General Exam Comments


Initial Comments: 





This a well-developed sec appearing female who is awake and alert and 

demonstrates no acute distress however she does demonstrate spasticity and does 

not cooperate with vitals


Limitations: language barrier, physical limitation


General appearance: alert, in no apparent distress


Head exam: Present: atraumatic, normocephalic, normal inspection


Eye exam: Present: normal appearance, PERRL, EOMI.  Absent: scleral icterus, 

conjunctival injection, periorbital swelling


ENT exam: Present: normal exam, mucous membranes moist


Respiratory exam: Present: normal lung sounds bilaterally.  Absent: respiratory 

distress, wheezes, rales, rhonchi, stridor


Cardiovascular Exam: Present: regular rate, normal rhythm, normal heart sounds. 

Absent: systolic murmur, diastolic murmur, rubs, gallop, clicks


GI/Abdominal exam: Present: soft, other (Chronic PEG tube site seen in the left 

upper quadrant.).  Absent: tenderness


Extremities exam: Present: normal capillary refill


Neurological exam: Present: alert


Psychiatric exam: Present: normal affect, normal mood (Per caregiver)


Skin exam: Present: warm, dry, intact, normal color.  Absent: rash





Course


                                   Vital Signs











  07/22/20





  15:06


 


Temperature 97.4 F L


 


Pulse Rate 77


 


Respiratory 16





Rate 


 


O2 Sat by Pulse 95





Oximetry 














Procedures





- Procedures


Initial comment: 





The patient required a #16-Kazakh PEG tube replacement.  This is based on 

previous insertions.  I did inspect the area appears be patent a #16-Kazakh PEG 

tube was placed by me without difficulty.  Approximately 4 mL of saline used to 

inflate the balloon.  Patient did tolerate this well and is in good position.





Disposition


Clinical Impression: 


 PEG tube malfunction





Disposition: HOME SELF-CARE


Condition: Good


Instructions (If sedation given, give patient instructions):  PEG Tube Insertion

(DC)


Is patient prescribed a controlled substance at d/c from ED?: No


Referrals: 


Austin Cruz Jr,  [Primary Care Provider] - 1-2 days

## 2021-02-26 ENCOUNTER — HOSPITAL ENCOUNTER (EMERGENCY)
Dept: HOSPITAL 47 - EC | Age: 56
Discharge: HOME | End: 2021-02-26
Payer: MEDICARE

## 2021-02-26 VITALS
DIASTOLIC BLOOD PRESSURE: 64 MMHG | RESPIRATION RATE: 18 BRPM | SYSTOLIC BLOOD PRESSURE: 108 MMHG | HEART RATE: 70 BPM | TEMPERATURE: 97.5 F

## 2021-02-26 DIAGNOSIS — N20.0: ICD-10-CM

## 2021-02-26 DIAGNOSIS — Z79.899: ICD-10-CM

## 2021-02-26 DIAGNOSIS — Z88.8: ICD-10-CM

## 2021-02-26 DIAGNOSIS — Z79.890: ICD-10-CM

## 2021-02-26 DIAGNOSIS — Z87.442: ICD-10-CM

## 2021-02-26 DIAGNOSIS — Z93.1: ICD-10-CM

## 2021-02-26 DIAGNOSIS — Z87.440: ICD-10-CM

## 2021-02-26 DIAGNOSIS — R31.9: Primary | ICD-10-CM

## 2021-02-26 DIAGNOSIS — K21.9: ICD-10-CM

## 2021-02-26 DIAGNOSIS — F32.9: ICD-10-CM

## 2021-02-26 DIAGNOSIS — G43.909: ICD-10-CM

## 2021-02-26 DIAGNOSIS — Z88.0: ICD-10-CM

## 2021-02-26 DIAGNOSIS — Z79.1: ICD-10-CM

## 2021-02-26 LAB
ALBUMIN SERPL-MCNC: 4.3 G/DL (ref 3.5–5)
ALP SERPL-CCNC: 121 U/L (ref 38–126)
ALT SERPL-CCNC: 26 U/L (ref 4–34)
ANION GAP SERPL CALC-SCNC: 7 MMOL/L
AST SERPL-CCNC: 30 U/L (ref 14–36)
BASOPHILS # BLD AUTO: 0 K/UL (ref 0–0.2)
BASOPHILS NFR BLD AUTO: 0 %
BUN SERPL-SCNC: 29 MG/DL (ref 7–17)
CALCIUM SPEC-MCNC: 10.3 MG/DL (ref 8.4–10.2)
CHLORIDE SERPL-SCNC: 110 MMOL/L (ref 98–107)
CO2 SERPL-SCNC: 26 MMOL/L (ref 22–30)
EOSINOPHIL # BLD AUTO: 0.1 K/UL (ref 0–0.7)
EOSINOPHIL NFR BLD AUTO: 2 %
ERYTHROCYTE [DISTWIDTH] IN BLOOD BY AUTOMATED COUNT: 4.29 M/UL (ref 3.8–5.4)
ERYTHROCYTE [DISTWIDTH] IN BLOOD: 13.4 % (ref 11.5–15.5)
GLUCOSE SERPL-MCNC: 75 MG/DL (ref 74–99)
HCT VFR BLD AUTO: 42.3 % (ref 34–46)
HGB BLD-MCNC: 13.9 GM/DL (ref 11.4–16)
LYMPHOCYTES # SPEC AUTO: 2.2 K/UL (ref 1–4.8)
LYMPHOCYTES NFR SPEC AUTO: 32 %
MCH RBC QN AUTO: 32.4 PG (ref 25–35)
MCHC RBC AUTO-ENTMCNC: 32.8 G/DL (ref 31–37)
MCV RBC AUTO: 98.6 FL (ref 80–100)
MONOCYTES # BLD AUTO: 0.5 K/UL (ref 0–1)
MONOCYTES NFR BLD AUTO: 7 %
NEUTROPHILS # BLD AUTO: 4 K/UL (ref 1.3–7.7)
NEUTROPHILS NFR BLD AUTO: 58 %
PH UR: 8 [PH] (ref 5–8)
PLATELET # BLD AUTO: 182 K/UL (ref 150–450)
POTASSIUM SERPL-SCNC: 4.3 MMOL/L (ref 3.5–5.1)
PROT SERPL-MCNC: 7.4 G/DL (ref 6.3–8.2)
PROT UR QL: (no result)
RBC UR QL: 159 /HPF (ref 0–5)
SODIUM SERPL-SCNC: 143 MMOL/L (ref 137–145)
SP GR UR: 1.02 (ref 1–1.03)
UROBILINOGEN UR QL STRIP: <2 MG/DL (ref ?–2)
WBC # BLD AUTO: 6.9 K/UL (ref 3.8–10.6)
WBC # UR AUTO: 47 /HPF (ref 0–5)

## 2021-02-26 PROCEDURE — 87086 URINE CULTURE/COLONY COUNT: CPT

## 2021-02-26 PROCEDURE — 99284 EMERGENCY DEPT VISIT MOD MDM: CPT

## 2021-02-26 PROCEDURE — 81001 URINALYSIS AUTO W/SCOPE: CPT

## 2021-02-26 PROCEDURE — 80053 COMPREHEN METABOLIC PANEL: CPT

## 2021-02-26 PROCEDURE — 36415 COLL VENOUS BLD VENIPUNCTURE: CPT

## 2021-02-26 PROCEDURE — 85025 COMPLETE CBC W/AUTO DIFF WBC: CPT

## 2021-02-26 PROCEDURE — 74176 CT ABD & PELVIS W/O CONTRAST: CPT

## 2021-02-26 NOTE — ED
General Adult HPI





- General


Chief complaint: Urogenital


Stated complaint: Hematuria


Time Seen by Provider: 02/26/21 10:58


Source: EMS


Mode of arrival: EMS


Limitations: language barrier, physical limitation





- History of Present Illness


Initial comments: 





56-year-old female who is nonverbal with a past medical history of cerebral 

palsy, UTI, kidney stones presents to the emergency department for hematuria.  

Apparently patient had blood in her urine earlier today and staff wanted her 

evaluated.  Per EMS patient was treated for a kidney stone a month and a half 

ago.  No fevers.  No other complains at this time.Patient has no other 

complaints at this time including shortness of breath, chest pain, abdominal 

pain, nausea or vomiting, headache, or visual changes.





- Related Data


                                Home Medications











 Medication  Instructions  Recorded  Confirmed


 


Dantrolene [Dantrium] 25 mg PEG/G-TUBE QID 05/19/14 02/26/21


 


Topiramate [Topamax] 100 mg PEG/G-TUBE HS@2000 05/19/14 02/26/21


 


diazePAM [Valium] 5 mg PEG/G-TUBE QID 05/20/14 02/26/21


 


Baclofen [Lioresal] 20 mg PEG/G-TUBE TID@0600,1000,2000 04/25/17 02/26/21


 


Benztropine Mesylate [Cogentin] 1 mg PEG/G-TUBE TID@0600,1630,2000 04/25/17 02/26/21


 


Vitamin E (Dl,Tocopheryl Acet) 800 unit PEG/G-TUBE DAILY@1000 04/25/17 02/26/21





[Vitamin E]   


 


diazePAM [Valium] 2 mg PEG/G-TUBE TID@0600,1630,2000 04/25/17 02/26/21


 


Acetaminophen Tab [Tylenol Tab] 500 mg PEG/G-TUBE AS DIRECTED PRN 02/26/21 02/26/21


 


Arctic Relief Gel  1 applic TOPICAL TID@1000,1630,2000 02/26/21 02/26/21


 


Azelastine HCl 2 spray EA NOSTRIL BID PRN 02/26/21 02/26/21


 


Bisacodyl 5 mg PEG/G-TUBE DAILY@1000 02/26/21 02/26/21


 


Celecoxib [CeleBREX] 200 mg PEG/G-TUBE DAILY@0600 02/26/21 02/26/21


 


Citalopram Hydrobromide [CeleXA 20 mg PEG/G-TUBE DAILY 02/26/21 02/26/21





Oral Soln]   


 


Dicyclomine [Bentyl] 10 mg PEG/G-TUBE TID@0600,1000,2000 02/26/21 02/26/21


 


Ear Drops 5 - 10 drops BOTH EARS DAILY PRN 02/26/21 02/26/21


 


Ergocalciferol (Vitamin D2) 50 mcg PEG/G-TUBE DAILY@0600 02/26/21 02/26/21





[Vitamin D2 (2000 Iu)]   


 


Famotidine [Pepcid] 20 mg PEG/G-TUBE BID@0600,2000 02/26/21 02/26/21


 


HYDROcodone/APAP 5-325MG [Norco 1 tab PEG/G-TUBE Q8H PRN 02/26/21 02/26/21





5-325]   


 


Hemp Oil 0.25 ml TOPICAL BID PRN 02/26/21 02/26/21


 


Ibuprofen [Motrin Ib] 1 dose PEG/G-TUBE AS DIRECTED PRN 02/26/21 02/26/21


 


Lactulose 1 dose PEG/G-TUBE AS DIRECTED PRN 02/26/21 02/26/21


 


Loperamide [Imodium] 2 mg PO AS DIRECTED PRN 02/26/21 02/26/21


 


Magnesium Citrate 148 ml PEG/G-TUBE DAILY PRN 02/26/21 02/26/21


 


Melatonin 3 mg PEG/G-TUBE HS@2000 02/26/21 02/26/21


 


Mupirocin 2% Oint [Bactroban 2% 1 gm TOPICAL BID PRN 02/26/21 02/26/21





Oint]   


 


Na Phos,M-B/Na Phos,Di-Ba [Fleet 133 ml RECTAL DAILY PRN 02/26/21 02/26/21





Adult]   


 


Nystatin 100,000 Unit/gm Powd 1 applic TOPICAL BID PRN 02/26/21 02/26/21





[Mycostatin Powder]   


 


Nystatin 100,000Unit/gm Cream 1 applic TOPICAL BID PRN 02/26/21 02/26/21





[Mycostatin Cream]   


 


OLANZapine 7.5 mg PEG/G-TUBE HS@2000 02/26/21 02/26/21


 


Petrolatum, White [Aquaphor] 1 applic TOPICAL BID PRN MDD APPLY 02/26/21 02/26/21





 AROUND PEG TUBE  


 


Polyethylene Glycol 3350 [Miralax] 17 gm PEG/G-TUBE DAILY@0630 02/26/21 02/26/21


 


SUMAtriptan SUCCINATE [Imitrex] 100 mg PEG/G-TUBE BID PRN 02/26/21 02/26/21


 


Sennosides [Senna] 8.6 mg PEG/G-TUBE BID@0600,2000 02/26/21 02/26/21


 


Twocal Hn Liq 237 ml PEG/G-TUBE QID PRN 02/26/21 02/26/21


 


bisacodyL [Dulcolax] 10 mg RECTAL AS DIRECTED PRN 02/26/21 02/26/21


 


diphenhydrAMINE [Benadryl] 25 mg PO AS DIRECTED PRN 02/26/21 02/26/21


 


guaiFENesin [guaiFENesin Oral 1 dose PEG/G-TUBE AS DIRECTED 02/26/21 02/26/21





Solution]   








                                  Previous Rx's











 Medication  Instructions  Recorded


 


Sulfamethox-Tmp 800-160Mg [Bactrim 1 tab PO Q12HR #10 tab 02/26/21





-160 mg]  











                                    Allergies











Allergy/AdvReac Type Severity Reaction Status Date / Time


 


amoxicillin Allergy  Rash/Hives Verified 02/26/21 11:02


 


Penicillins Allergy  Unknown Verified 02/26/21 11:02


 


risperidone [From Risperdal] AdvReac  Tardive Verified 02/26/21 11:02





   Dyskinesia  














Review of Systems


ROS Statement: 


Those systems with pertinent positive or pertinent negative responses have been 

documented in the HPI.





ROS Other: All systems not noted in ROS Statement are negative.





Past Medical History


Past Medical History: GERD/Reflux, Pneumonia


Additional Past Medical History / Comment(s): migraines, UTI, .CEREBRAL PALSY. 

PEG TUBE


History of Any Multi-Drug Resistant Organisms: None Reported


Past Surgical History: Appendectomy, Hysterectomy


Additional Past Surgical History / Comment(s): cerebral palsy, congenital quad. 

temeors


Past Anesthesia/Blood Transfusion Reactions: No Reported Reaction


Past Psychological History: No Psychological Hx Reported, Depression


Smoking Status: Never smoker


Past Alcohol Use History: None Reported


Past Drug Use History: None Reported





- Past Family History


  ** Father


Family Medical History: Asthma





  ** Mother


Family Medical History: COPD





General Exam


Limitations: language barrier, physical limitation


General appearance: alert, in no apparent distress


Head exam: Present: atraumatic, normal inspection


Eye exam: Present: normal appearance, PERRL, EOMI.  Absent: scleral icterus, 

conjunctival injection


ENT exam: Present: normal exam, mucous membranes moist


Neck exam: Present: normal inspection, full ROM.  Absent: tenderness


Respiratory exam: Present: normal lung sounds bilaterally.  Absent: respiratory 

distress, wheezes


Cardiovascular Exam: Present: regular rate, normal rhythm, normal heart sounds


GI/Abdominal exam: Present: soft, normal bowel sounds.  Absent: distended, 

tenderness, guarding, rebound, rigid





Course


                                   Vital Signs











  02/26/21





  10:53


 


Temperature 97.5 F L


 


Pulse Rate 70


 


Respiratory 18





Rate 


 


Blood Pressure 108/64


 


O2 Sat by Pulse 99





Oximetry 














Medical Decision Making





- Medical Decision Making





Vitals are stable.  CBC CMP unremarkable.  Urinalysis does show 159 red blood 

cells.  47 white cells.  Given patient is nonverbal CAT scan was obtained which 

showed fecal stasis and nonobstructing left-sided nephrolithiasis.  Given 47 

white cells we will treat patient with antibiotics for caution until culture 

results.  We will have her follow up with primary care and urology.  She will 

return here for any worsening symptoms.





- Lab Data


Result diagrams: 


                                 02/26/21 11:26





                                 02/26/21 11:26


                                   Lab Results











  02/26/21 02/26/21 02/26/21 Range/Units





  10:59 11:26 11:26 


 


WBC   6.9   (3.8-10.6)  k/uL


 


RBC   4.29   (3.80-5.40)  m/uL


 


Hgb   13.9   (11.4-16.0)  gm/dL


 


Hct   42.3   (34.0-46.0)  %


 


MCV   98.6   (80.0-100.0)  fL


 


MCH   32.4   (25.0-35.0)  pg


 


MCHC   32.8   (31.0-37.0)  g/dL


 


RDW   13.4   (11.5-15.5)  %


 


Plt Count   182   (150-450)  k/uL


 


MPV   9.5   


 


Neutrophils %   58   %


 


Lymphocytes %   32   %


 


Monocytes %   7   %


 


Eosinophils %   2   %


 


Basophils %   0   %


 


Neutrophils #   4.0   (1.3-7.7)  k/uL


 


Lymphocytes #   2.2   (1.0-4.8)  k/uL


 


Monocytes #   0.5   (0-1.0)  k/uL


 


Eosinophils #   0.1   (0-0.7)  k/uL


 


Basophils #   0.0   (0-0.2)  k/uL


 


Sodium    143  (137-145)  mmol/L


 


Potassium    4.3  (3.5-5.1)  mmol/L


 


Chloride    110 H  ()  mmol/L


 


Carbon Dioxide    26  (22-30)  mmol/L


 


Anion Gap    7  mmol/L


 


BUN    29 H  (7-17)  mg/dL


 


Creatinine    0.74  (0.52-1.04)  mg/dL


 


Est GFR (CKD-EPI)AfAm    >90  (>60 ml/min/1.73 sqM)  


 


Est GFR (CKD-EPI)NonAf    >90  (>60 ml/min/1.73 sqM)  


 


Glucose    75  (74-99)  mg/dL


 


Calcium    10.3 H  (8.4-10.2)  mg/dL


 


Total Bilirubin    0.2  (0.2-1.3)  mg/dL


 


AST    30  (14-36)  U/L


 


ALT    26  (4-34)  U/L


 


Alkaline Phosphatase    121  ()  U/L


 


Total Protein    7.4  (6.3-8.2)  g/dL


 


Albumin    4.3  (3.5-5.0)  g/dL


 


Urine Color  Yellow    


 


Urine Appearance  Turbid H    (Clear)  


 


Urine pH  8.0    (5.0-8.0)  


 


Ur Specific Gravity  1.016    (1.001-1.035)  


 


Urine Protein  2+ H    (Negative)  


 


Urine Glucose (UA)  Negative    (Negative)  


 


Urine Ketones  Negative    (Negative)  


 


Urine Blood  Large H    (Negative)  


 


Urine Nitrite  Negative    (Negative)  


 


Urine Bilirubin  Negative    (Negative)  


 


Urine Urobilinogen  <2.0    (<2.0)  mg/dL


 


Ur Leukocyte Esterase  Moderate H    (Negative)  


 


Urine RBC  159 H    (0-5)  /hpf


 


Urine WBC  47 H    (0-5)  /hpf


 


Amorphous Sediment  Rare H    (None)  /hpf


 


Urine Bacteria  Rare H    (None)  /hpf


 


Urine Mucus  Rare H    (None)  /hpf














Disposition


Clinical Impression: 


 Hematuria





Disposition: HOME SELF-CARE


Condition: Good


Instructions (If sedation given, give patient instructions):  Hematuria (ED)


Additional Instructions: 


Please give antibiotic as directed.  Please follow-up with patient's primary 

care or urologist for hematuria. If patient has any worsening symptoms such as p

ain or fevers return to the emergency room.


Prescriptions: 


Sulfamethox-Tmp 800-160Mg [Bactrim -160 mg] 1 tab PO Q12HR #10 tab


Is patient prescribed a controlled substance at d/c from ED?: No


Referrals: 


Rom Terry MD [Primary Care Provider] - 1-2 days


Time of Disposition: 13:04

## 2021-02-26 NOTE — CT
EXAMINATION TYPE: CT abdomen pelvis wo con

 

DATE OF EXAM: 2/26/2021

 

COMPARISON: None

 

HISTORY: gross hematuria

 

CT DLP: 510.2 mGycm

 

Examination of the solid and hollow viscera is limited given the lack of contrast.

 

FINDINGS: 

 

LUNG BASES: No evidence for nodule. No evidence for infiltrate.

 

LIVER/GB: The gallbladder is unremarkable. No space-occupying hepatic lesion.

 

PANCREAS: No pancreatic mass identified. No inflammatory process seen.

 

SPLEEN: No evidence for splenomegaly. No intrasplenic lesions seen.

 

ADRENALS: No adrenal nodules identified. No evidence for thickening.

 

KIDNEYS: No evidence for renal mass. There is nonobstructing left-sided nephrolithiasis. No hydroneph
rosis.

 

BOWEL: Appendix has a normal appearance. No evidence of bowel obstruction. No inflammatory process. F
eeding tube is in place. Moderate to severe fecal stasis.

 

Lymph nodes: No evidence for adenopathy greater than 1 cm.

 

Abdominal aorta: Atheromatous changes seen. No evidence for aneurysm.

 

Genital organs: No significant abnormality.

 

Other: No significant abnormality.

 

IMPRESSION: 

1. Moderate to severe fecal stasis.

2. Nonobstructing left-sided nephrolithiasis.

## 2021-04-07 ENCOUNTER — HOSPITAL ENCOUNTER (OUTPATIENT)
Dept: HOSPITAL 47 - OR | Age: 56
Discharge: NURSING, REHAB OR LONG-TERM CARE | End: 2021-04-07
Attending: UROLOGY
Payer: MEDICARE

## 2021-04-07 VITALS — DIASTOLIC BLOOD PRESSURE: 77 MMHG | HEART RATE: 90 BPM | SYSTOLIC BLOOD PRESSURE: 120 MMHG

## 2021-04-07 VITALS — RESPIRATION RATE: 16 BRPM

## 2021-04-07 VITALS — TEMPERATURE: 97.8 F

## 2021-04-07 VITALS — BODY MASS INDEX: 25.2 KG/M2

## 2021-04-07 DIAGNOSIS — R32: ICD-10-CM

## 2021-04-07 DIAGNOSIS — T50.905A: ICD-10-CM

## 2021-04-07 DIAGNOSIS — Z90.49: ICD-10-CM

## 2021-04-07 DIAGNOSIS — Z88.8: ICD-10-CM

## 2021-04-07 DIAGNOSIS — Z87.01: ICD-10-CM

## 2021-04-07 DIAGNOSIS — N20.0: Primary | ICD-10-CM

## 2021-04-07 DIAGNOSIS — Z79.899: ICD-10-CM

## 2021-04-07 DIAGNOSIS — Z90.710: ICD-10-CM

## 2021-04-07 DIAGNOSIS — B96.4: ICD-10-CM

## 2021-04-07 DIAGNOSIS — K08.89: ICD-10-CM

## 2021-04-07 DIAGNOSIS — G24.01: ICD-10-CM

## 2021-04-07 DIAGNOSIS — Z88.0: ICD-10-CM

## 2021-04-07 DIAGNOSIS — Z82.5: ICD-10-CM

## 2021-04-07 DIAGNOSIS — G25.2: ICD-10-CM

## 2021-04-07 DIAGNOSIS — N30.20: ICD-10-CM

## 2021-04-07 DIAGNOSIS — Z93.1: ICD-10-CM

## 2021-04-07 DIAGNOSIS — Z87.440: ICD-10-CM

## 2021-04-07 DIAGNOSIS — G80.9: ICD-10-CM

## 2021-04-07 DIAGNOSIS — Z87.442: ICD-10-CM

## 2021-04-07 DIAGNOSIS — N32.9: ICD-10-CM

## 2021-04-07 DIAGNOSIS — G43.909: ICD-10-CM

## 2021-04-07 DIAGNOSIS — K21.9: ICD-10-CM

## 2021-04-07 PROCEDURE — 52353 CYSTOURETERO W/LITHOTRIPSY: CPT

## 2021-04-07 PROCEDURE — 82365 CALCULUS SPECTROSCOPY: CPT

## 2021-04-07 PROCEDURE — 74018 RADEX ABDOMEN 1 VIEW: CPT

## 2021-04-07 NOTE — XR
EXAMINATION TYPE: XR KUB

 

DATE OF EXAM: 4/7/2021

 

COMPARISON: 7/6/2018

 

HISTORY: Presurgical

 

TECHNIQUE: One view abdominal series

 

FINDINGS:  

The osseous structures are intact.  The bowel gas pattern is nonspecific. Air is seen throughout the 
bowel. Suggestion of possible PEG tube. Severe arthropathy of the right hip complete loss of joint sp
ace and remodeling. Elevated right hemidiaphragm.

 

IMPRESSION:

1. Nonspecific abdomen with distended bowel loops seen throughout the abdomen. Air is seen in the rec
neymar. Ileus in the differential diagnosis. Correlate clinically to exclude obstruction.

## 2021-04-07 NOTE — FL
EXAMINATION TYPE: FL guidance operating room

 

DATE OF EXAM: 4/7/2021

 

HISTORY: Fluoroscopy  time

 

11 seconds of fluoroscopy provided. 

 

IMPRESSION:

1. Fluoroscopy time.

## 2021-04-07 NOTE — P.OP
Date of Procedure: 04/07/21


Preoperative Diagnosis: 


Recurrent Proteus urinary tract infection, left renal stones


Postoperative Diagnosis: 


Same, possible colovesical fistula


Procedure(s) Performed: 


Cystoscopy, left ureteroscopy laser lithotripsy


Anesthesia: JAYME


Surgeon: Momo Syed


Estimated Blood Loss (ml): 10


Pathology: other (Stone)


Condition: stable


Disposition: PACU


Indications for Procedure: 


The patient is a 56-year-old cerebral palsy patient with significant tardive 

dyskinesia mental handicap a.  She is any an extended care facility.  She is 

chronically incontinent.  She has had recurrent Proteus mirabilis urinary tract 

infections.  She had a computed tomography scan that identified left renal 

stones.  She comes for ureteroscopy and laser lithotripsy.


Description of Procedure: 


The patient is brought to the operative suite.  She is given a general LMA 

anesthetic.  She's placed lithotomy position with sterile prep and drape.  

Cystoscopy Foroblique lens and 21-[place]ian sheath identifies chronic cystitis but 

there is a significant mounded area of edema and erythema on the left lateral 

wall consistent with a colovesical fistula.  Or stone.  The left ureteral 

orifice is intubated with no 35 wire that passed up into the kidney.  Over the 

wires passed [id number] reentry sheath.  I removed the inner sheath and passed

flexible ureteroscope up into the kidney.  There are several small whitish 

stones consistent with infected stones.  These are dusted.  At the end of the 

procedure there is no remaining significant stone noted to.  The ureteroscope 

removed.  The enteroscopy sheath is removed.  Inspect the bladder and again 

identify the mounded edematous area in the left lateral wall





The bladder strain the patient's awake and returned recovery in good condition





Impression REcurrent Proteus urinary infection.  Urinary incontinence.  Left 

renal stones removed.  Possible colovesical fistula.





Recommendations: The patient will continue on antibiotics been seen in the 

office in one week a period of that point time we will decide further evaluation

for the bladder abnormality.

## 2021-04-07 NOTE — P.GSHP
History of Present Illness


H&P Date: 04/07/21





56-year-old female with cerebral palsy that is quite advanced has had recurring 

urinary infections.  Infections are with Proteus mirabilis consistent with a 

kidney stone.  A computed tomography scan showed nonobstructing stones in the 

left kidney.  To hopefully help rid her of her infections we'll remove the 

stones.  She comes for ureteroscopy and laser lithotripsy





- Review of Systems


ROS unobtainable: Reports: due to mental status





Past Medical History


Past Medical History: GERD/Reflux, Pneumonia


Additional Past Medical History / Comment(s): migraines, UTI, .CEREBRAL PALSY. 

PEG TUBE, KIDNEY STONES, CONGENTIAL QUAD TREMORS, PT IS TOTAL CARE IN [facility]/C AND IS

NON VERBAL. DOES UNDERSTAND AND FOLLOW VERBAL COMMUNICATION PER STAFF AT [facility].


History of Any Multi-Drug Resistant Organisms: None Reported


Past Surgical History: Appendectomy, Hysterectomy


Additional Past Surgical History / Comment(s): cerebral palsy, congenital quad. 

temeors


Past Anesthesia/Blood Transfusion Reactions: No Reported Reaction


Smoking Status: Never smoker





- Past Family History


  ** Father


Family Medical History: Asthma





  ** Mother


Family Medical History: COPD





Medications and Allergies


                                Home Medications











 Medication  Instructions  Recorded  Confirmed  Type


 


Dantrolene [Dantrium] 25 mg PEG/G-TUBE QID 05/19/14 04/07/21 History


 


Topiramate [Topamax] 100 mg PEG/G-TUBE HS@2000 05/19/14 04/07/21 History


 


diazePAM [Valium] 5 mg PEG/G-TUBE QID 05/20/14 04/07/21 History


 


Baclofen [Lioresal] 20 mg PEG/G-TUBE TID@0600,1000,2000 04/25/17 04/07/21 

History


 


Benztropine Mesylate [Cogentin] 1 mg PEG/G-TUBE TID@0600,1630,2000 04/25/17 04/07/21 History


 


diazePAM [Valium] 2 mg PEG/G-TUBE TID@0600,1630,2000 04/25/17 04/07/21 History


 


Acetaminophen Tab [Tylenol Tab] 500 mg PEG/G-TUBE AS DIRECTED PRN 02/26/21 04/07/21 History


 


Arctic Relief Gel  1 applic TOPICAL TID@1000,1630,2000 02/26/21 04/07/21 History


 


Azelastine HCl 2 spray EA NOSTRIL BID PRN 02/26/21 04/07/21 History


 


Citalopram Hydrobromide [CeleXA 20 mg PEG/G-TUBE DAILY 02/26/21 04/07/21 History





Oral Soln]    


 


Dicyclomine [Bentyl] 10 mg PEG/G-TUBE TID@0600,1000,2000 02/26/21 04/07/21 

History


 


Ear Drops 5 - 10 drops BOTH EARS DAILY PRN 02/26/21 04/07/21 History


 


Famotidine [Pepcid] 20 mg PEG/G-TUBE BID@0600,2000 02/26/21 04/07/21 History


 


HYDROcodone/APAP 5-325MG [Norco 1 tab PEG/G-TUBE Q8H PRN 02/26/21 04/07/21 

History





5-325]    


 


Hemp Oil 0.25 ml TOPICAL BID PRN 02/26/21 04/07/21 History


 


Ibuprofen [Motrin Ib] 1 dose PEG/G-TUBE AS DIRECTED PRN 02/26/21 04/07/21 

History


 


Lactulose 1 dose PEG/G-TUBE AS DIRECTED PRN 02/26/21 04/07/21 History


 


Loperamide [Imodium] 2 mg PO AS DIRECTED PRN 02/26/21 04/07/21 History


 


Magnesium Citrate 148 ml PEG/G-TUBE DAILY PRN 02/26/21 04/07/21 History


 


Melatonin 3 mg PEG/G-TUBE HS@2000 02/26/21 04/07/21 History


 


Mupirocin 2% Oint [Bactroban 2% 1 gm TOPICAL BID PRN 02/26/21 04/07/21 History





Oint]    


 


Na Phos,M-B/Na Phos,Di-Ba [Fleet 133 ml RECTAL DAILY PRN 02/26/21 04/07/21 

History





Adult]    


 


Nystatin 100,000 Unit/gm Powd 1 applic TOPICAL BID PRN 02/26/21 04/07/21 History





[Mycostatin Powder]    


 


Nystatin 100,000Unit/gm Cream 1 applic TOPICAL BID PRN 02/26/21 04/07/21 History





[Mycostatin Cream]    


 


OLANZapine 7.5 mg PEG/G-TUBE HS@2000 02/26/21 04/07/21 History


 


Petrolatum, White [Aquaphor] 1 applic TOPICAL BID PRN MDD APPLY 02/26/21 04/07/21 History





 AROUND PEG TUBE   


 


Polyethylene Glycol 3350 [Miralax] 17 gm PEG/G-TUBE DAILY@0630 02/26/21 04/07/21

 History


 


SUMAtriptan SUCCINATE [Imitrex] 100 mg PEG/G-TUBE BID PRN 02/26/21 04/07/21 

History


 


Sennosides [Senna] 8.6 mg PEG/G-TUBE BID@0600,2000 02/26/21 04/07/21 History


 


Twocal Hn Liq 237 ml PEG/G-TUBE QID PRN 02/26/21 04/07/21 History


 


diphenhydrAMINE [Benadryl] 25 mg PO AS DIRECTED PRN 02/26/21 04/07/21 History


 


guaiFENesin [guaiFENesin Oral 1 dose PEG/G-TUBE AS DIRECTED 02/26/21 04/07/21 

History





Solution]    








                                    Allergies











Allergy/AdvReac Type Severity Reaction Status Date / Time


 


amoxicillin Allergy  Rash/Hives Verified 04/07/21 09:18


 


Penicillins Allergy  Unknown Verified 04/07/21 09:18


 


risperidone [From Risperdal] AdvReac  Tardive Verified 04/07/21 09:18





   Dyskinesia  














Surgical - Exam


                                   Vital Signs











Temp Pulse Resp BP Pulse Ox


 


 98.0 F   80   20   150/113   92 L


 


 04/07/21 09:51  04/07/21 09:51  04/07/21 09:51  04/07/21 09:51  04/07/21 09:51














- General





Classic continued dystonic movements of cerebral palsy


well developed, well nourished





- Eyes


PERRL





- Neck


trachea midline





- Respiratory


normal expansion, normal respiratory effort





- Cardiovascular


Rhythm: regular





- Abdomen


Abdomen: soft, non tender





Results





- Imaging


CT scan - abdomen: report reviewed, image reviewed


CT scan - pelvis: report reviewed, image reviewed





Assessment and Plan


Assessment: 





Impression: Recurrent urinary infection with Proteus mirabilis. Left renal 

stones.  Cerebral palsy.





Recommendations: Left ureteroscopy laser lithotripsy possible stent placement

## 2022-07-25 ENCOUNTER — HOSPITAL ENCOUNTER (INPATIENT)
Dept: HOSPITAL 47 - EC | Age: 57
LOS: 3 days | Discharge: HOSPICE | DRG: 871 | End: 2022-07-28
Attending: HOSPITALIST | Admitting: HOSPITALIST
Payer: MEDICARE

## 2022-07-25 DIAGNOSIS — Z90.89: ICD-10-CM

## 2022-07-25 DIAGNOSIS — K63.1: ICD-10-CM

## 2022-07-25 DIAGNOSIS — D64.9: ICD-10-CM

## 2022-07-25 DIAGNOSIS — Z90.710: ICD-10-CM

## 2022-07-25 DIAGNOSIS — G80.8: ICD-10-CM

## 2022-07-25 DIAGNOSIS — Z79.891: ICD-10-CM

## 2022-07-25 DIAGNOSIS — A41.9: Primary | ICD-10-CM

## 2022-07-25 DIAGNOSIS — G43.909: ICD-10-CM

## 2022-07-25 DIAGNOSIS — G93.41: ICD-10-CM

## 2022-07-25 DIAGNOSIS — Z66: ICD-10-CM

## 2022-07-25 DIAGNOSIS — Z79.899: ICD-10-CM

## 2022-07-25 DIAGNOSIS — E87.0: ICD-10-CM

## 2022-07-25 DIAGNOSIS — J69.0: ICD-10-CM

## 2022-07-25 DIAGNOSIS — K66.8: ICD-10-CM

## 2022-07-25 DIAGNOSIS — F41.9: ICD-10-CM

## 2022-07-25 DIAGNOSIS — Z88.8: ICD-10-CM

## 2022-07-25 DIAGNOSIS — I10: ICD-10-CM

## 2022-07-25 DIAGNOSIS — Z88.0: ICD-10-CM

## 2022-07-25 DIAGNOSIS — Z87.01: ICD-10-CM

## 2022-07-25 DIAGNOSIS — E66.9: ICD-10-CM

## 2022-07-25 DIAGNOSIS — Z87.442: ICD-10-CM

## 2022-07-25 DIAGNOSIS — K56.7: ICD-10-CM

## 2022-07-25 DIAGNOSIS — Z82.5: ICD-10-CM

## 2022-07-25 DIAGNOSIS — Q79.8: ICD-10-CM

## 2022-07-25 DIAGNOSIS — Z93.1: ICD-10-CM

## 2022-07-25 DIAGNOSIS — R25.1: ICD-10-CM

## 2022-07-25 DIAGNOSIS — Z51.5: ICD-10-CM

## 2022-07-25 DIAGNOSIS — R00.0: ICD-10-CM

## 2022-07-25 DIAGNOSIS — J96.01: ICD-10-CM

## 2022-07-25 LAB
ALBUMIN SERPL-MCNC: 3.9 G/DL (ref 3.5–5)
ALP SERPL-CCNC: 112 U/L (ref 38–126)
ALT SERPL-CCNC: 14 U/L (ref 4–34)
ANION GAP SERPL CALC-SCNC: 7 MMOL/L
APTT BLD: 24.1 SEC (ref 22–30)
AST SERPL-CCNC: 24 U/L (ref 14–36)
BASOPHILS # BLD AUTO: 0.1 K/UL (ref 0–0.2)
BASOPHILS NFR BLD AUTO: 0 %
BUN SERPL-SCNC: 24 MG/DL (ref 7–17)
CALCIUM SPEC-MCNC: 9.3 MG/DL (ref 8.4–10.2)
CHLORIDE SERPL-SCNC: 107 MMOL/L (ref 98–107)
CO2 SERPL-SCNC: 25 MMOL/L (ref 22–30)
EOSINOPHIL # BLD AUTO: 0.1 K/UL (ref 0–0.7)
EOSINOPHIL NFR BLD AUTO: 1 %
ERYTHROCYTE [DISTWIDTH] IN BLOOD BY AUTOMATED COUNT: 4.2 M/UL (ref 3.8–5.4)
ERYTHROCYTE [DISTWIDTH] IN BLOOD: 15.2 % (ref 11.5–15.5)
GLUCOSE SERPL-MCNC: 166 MG/DL (ref 74–99)
HCT VFR BLD AUTO: 41 % (ref 34–46)
HGB BLD-MCNC: 12.4 GM/DL (ref 11.4–16)
INR PPP: 0.9 (ref ?–1.2)
LYMPHOCYTES # SPEC AUTO: 1.1 K/UL (ref 1–4.8)
LYMPHOCYTES NFR SPEC AUTO: 7 %
MAGNESIUM SPEC-SCNC: 2.1 MG/DL (ref 1.6–2.3)
MCH RBC QN AUTO: 29.6 PG (ref 25–35)
MCHC RBC AUTO-ENTMCNC: 30.3 G/DL (ref 31–37)
MCV RBC AUTO: 97.7 FL (ref 80–100)
MONOCYTES # BLD AUTO: 0.6 K/UL (ref 0–1)
MONOCYTES NFR BLD AUTO: 4 %
NEUTROPHILS # BLD AUTO: 13.6 K/UL (ref 1.3–7.7)
NEUTROPHILS NFR BLD AUTO: 87 %
PLATELET # BLD AUTO: 334 K/UL (ref 150–450)
POTASSIUM SERPL-SCNC: 3.6 MMOL/L (ref 3.5–5.1)
PROT SERPL-MCNC: 7.3 G/DL (ref 6.3–8.2)
PT BLD: 9.9 SEC (ref 9–12)
SODIUM SERPL-SCNC: 139 MMOL/L (ref 137–145)
WBC # BLD AUTO: 15.6 K/UL (ref 3.8–10.6)

## 2022-07-25 PROCEDURE — 85610 PROTHROMBIN TIME: CPT

## 2022-07-25 PROCEDURE — 94640 AIRWAY INHALATION TREATMENT: CPT

## 2022-07-25 PROCEDURE — 86850 RBC ANTIBODY SCREEN: CPT

## 2022-07-25 PROCEDURE — 85025 COMPLETE CBC W/AUTO DIFF WBC: CPT

## 2022-07-25 PROCEDURE — 84100 ASSAY OF PHOSPHORUS: CPT

## 2022-07-25 PROCEDURE — 96366 THER/PROPH/DIAG IV INF ADDON: CPT

## 2022-07-25 PROCEDURE — 94760 N-INVAS EAR/PLS OXIMETRY 1: CPT

## 2022-07-25 PROCEDURE — 84484 ASSAY OF TROPONIN QUANT: CPT

## 2022-07-25 PROCEDURE — 36415 COLL VENOUS BLD VENIPUNCTURE: CPT

## 2022-07-25 PROCEDURE — 80053 COMPREHEN METABOLIC PANEL: CPT

## 2022-07-25 PROCEDURE — 71045 X-RAY EXAM CHEST 1 VIEW: CPT

## 2022-07-25 PROCEDURE — 86901 BLOOD TYPING SEROLOGIC RH(D): CPT

## 2022-07-25 PROCEDURE — 87635 SARS-COV-2 COVID-19 AMP PRB: CPT

## 2022-07-25 PROCEDURE — 71260 CT THORAX DX C+: CPT

## 2022-07-25 PROCEDURE — 80048 BASIC METABOLIC PNL TOTAL CA: CPT

## 2022-07-25 PROCEDURE — 85730 THROMBOPLASTIN TIME PARTIAL: CPT

## 2022-07-25 PROCEDURE — 99285 EMERGENCY DEPT VISIT HI MDM: CPT

## 2022-07-25 PROCEDURE — 83735 ASSAY OF MAGNESIUM: CPT

## 2022-07-25 PROCEDURE — 96376 TX/PRO/DX INJ SAME DRUG ADON: CPT

## 2022-07-25 PROCEDURE — 96375 TX/PRO/DX INJ NEW DRUG ADDON: CPT

## 2022-07-25 PROCEDURE — 96365 THER/PROPH/DIAG IV INF INIT: CPT

## 2022-07-25 PROCEDURE — 93005 ELECTROCARDIOGRAM TRACING: CPT

## 2022-07-25 PROCEDURE — 83880 ASSAY OF NATRIURETIC PEPTIDE: CPT

## 2022-07-25 PROCEDURE — 74177 CT ABD & PELVIS W/CONTRAST: CPT

## 2022-07-25 PROCEDURE — 96367 TX/PROPH/DG ADDL SEQ IV INF: CPT

## 2022-07-25 PROCEDURE — 86900 BLOOD TYPING SEROLOGIC ABO: CPT

## 2022-07-25 PROCEDURE — 83605 ASSAY OF LACTIC ACID: CPT

## 2022-07-25 RX ADMIN — CEFAZOLIN SCH MLS/HR: 330 INJECTION, POWDER, FOR SOLUTION INTRAMUSCULAR; INTRAVENOUS at 06:15

## 2022-07-25 RX ADMIN — PANTOPRAZOLE SODIUM SCH MG: 40 INJECTION, POWDER, FOR SOLUTION INTRAVENOUS at 21:15

## 2022-07-25 RX ADMIN — METRONIDAZOLE SCH MLS/HR: 500 INJECTION, SOLUTION INTRAVENOUS at 21:15

## 2022-07-25 RX ADMIN — HYDROMORPHONE HYDROCHLORIDE PRN MG: 1 INJECTION, SOLUTION INTRAMUSCULAR; INTRAVENOUS; SUBCUTANEOUS at 18:15

## 2022-07-25 RX ADMIN — HYDROMORPHONE HYDROCHLORIDE PRN MG: 1 INJECTION, SOLUTION INTRAMUSCULAR; INTRAVENOUS; SUBCUTANEOUS at 05:15

## 2022-07-25 RX ADMIN — HYDROMORPHONE HYDROCHLORIDE PRN MG: 1 INJECTION, SOLUTION INTRAMUSCULAR; INTRAVENOUS; SUBCUTANEOUS at 12:26

## 2022-07-25 RX ADMIN — METRONIDAZOLE SCH MLS/HR: 500 INJECTION, SOLUTION INTRAVENOUS at 13:18

## 2022-07-25 RX ADMIN — HYDROMORPHONE HYDROCHLORIDE PRN MG: 1 INJECTION, SOLUTION INTRAMUSCULAR; INTRAVENOUS; SUBCUTANEOUS at 23:55

## 2022-07-25 RX ADMIN — CEFAZOLIN SCH MLS/HR: 330 INJECTION, POWDER, FOR SOLUTION INTRAMUSCULAR; INTRAVENOUS at 17:01

## 2022-07-25 NOTE — P.GSCN
History of Present Illness


History of present illness: 





Patient Name: Ginny Martinez                 Medical Record Number: [id number]


YOB: 1965                              Patient Status: Inpatient


Attending Provider: Neda Rodriguez                     Account Number: [id number]


Date: 07/25/22 09:24                         Initialization Date: 07/25/22 09:24








History of Present Illness


H&P Date: 07/25/22


Chief Complaint: Shortness of breath





This a 57-year-old female with multiple medical problems.  The patient was 

brought him Min with shortness of breath.  She is a phasic.  She 

communicates to her sister via blinking.  The patient's has a DO NOT RESUSCITATE

DO NOT INTUBATE medical plan.  The patient underwent CAT scan is found have some

evidence of free air.  It is unclear of the etiology of free air.  Currently she

is not complaining of abdominal pain.





Past Medical History


Past Medical History: GERD/Reflux, Pneumonia


Additional Past Medical History / Comment(s): migraines, CEREBRAL PALSY. PEG 

TUBE, KIDNEY STONES, CONGENTIAL QUAD /TREMORS, PT IS TOTAL CARE IN W/C AND IS 

NON VERBAL. DOES UNDERSTAND AND FOLLOW VERBAL COMMUNICATION PER STAFF AT [facility].


History of Any Multi-Drug Resistant Organisms: None Reported


Past Surgical History: Appendectomy, Hysterectomy


Additional Past Surgical History / Comment(s): .


Past Anesthesia/Blood Transfusion Reactions: No Reported Reaction


Past Psychological History: No Psychological Hx Reported, Depression


Smoking Status: Never smoker


Past Alcohol Use History: None Reported


Past Drug Use History: None Reported





- Past Family History


  ** Father


Family Medical History: Asthma





  ** Mother


Family Medical History: COPD





Medications and Allergies


                                Home Medications











 Medication  Instructions  Recorded  Confirmed  Type


 


Dantrolene [Dantrium] 25 mg PEG/G-TUBE QID 05/19/14 04/08/22 History


 


Topiramate [Topamax] 100 mg PEG/G-TUBE HS@2000 05/19/14 04/08/22 History


 


diazePAM [Valium] 5 mg PEG/G-TUBE QID 05/20/14 04/08/22 History


 


Baclofen [Lioresal] 20 mg PEG/G-TUBE TID@0600,1000,2000 04/25/17 04/08/22 

History


 


diazePAM [Valium] 2 mg PEG/G-TUBE TID@0600,1630,2000 04/25/17 04/08/22 History


 


Acetaminophen Tab [Tylenol] 1,000 mg PEG/G-TUBE Q4H PRN 02/26/21 04/08/22 

History


 


Arctic Relief Gel  1 applic TOPICAL TID@1000,1630,2000 02/26/21 04/08/22 History


 


Azelastine HCl 2 spray EA NOSTRIL BID PRN 02/26/21 04/08/22 History


 


Citalopram Hydrobromide [CeleXA 20 ml PEG/G-TUBE DAILY 02/26/21 04/08/22 History





Oral Soln]    


 


Dicyclomine [Bentyl] 10 mg PEG/G-TUBE TID@0600,1000,2000 02/26/21 04/08/22 

History


 


Famotidine [Pepcid] 20 mg PEG/G-TUBE BID@0600,2000 02/26/21 04/08/22 History


 


HYDROcodone/APAP 5-325MG [Norco 1 tab PEG/G-TUBE Q8H PRN 02/26/21 04/08/22 

History





5-325]    


 


Ibuprofen [Motrin Ib] 200 mg PEG/G-TUBE Q6H PRN 02/26/21 04/08/22 History


 


Lactulose 10 - 20 gm PEG/G-TUBE AS DIRECTED 02/26/21 04/08/22 History





 PRN   


 


Loperamide [Imodium] 2 mg PO AS DIRECTED PRN 02/26/21 04/08/22 History


 


Magnesium Citrate 148 ml PEG/G-TUBE DAILY PRN 02/26/21 04/08/22 History


 


Melatonin 3 mg PEG/G-TUBE HS@2000 02/26/21 04/08/22 History


 


Mupirocin 2% Oint [Bactroban 2% 1 gm TOPICAL BID PRN 02/26/21 04/08/22 History





Oint]    


 


Na Phos,M-B/Na Phos,Di-Ba [Fleet 133 ml RECTAL DAILY PRN 02/26/21 04/08/22 

History





Adult]    


 


Nystatin 100,000Unit/gm Cream 1 applic TOPICAL BID PRN 02/26/21 04/08/22 History





[Mycostatin Cream]    


 


OLANZapine 15 mg PEG/G-TUBE HS@2000 02/26/21 04/08/22 History


 


Petrolatum, White [Aquaphor] 1 applic TOPICAL BID PRN MDD APPLY 02/26/21 04/08/22 History





 AROUND PEG TUBE   


 


Sennosides [Senna] 8.6 mg PEG/G-TUBE BID@0600,2000 02/26/21 04/08/22 History


 


Twocal Hn Liq 237 ml PEG/G-TUBE QID PRN 02/26/21 04/08/22 History


 


diphenhydrAMINE [Benadryl] 25 mg PO AS DIRECTED PRN 02/26/21 04/08/22 History


 


guaiFENesin [guaiFENesin Oral 1 dose PEG/G-TUBE AS DIRECTED PRN 02/26/21 04/08/22 History





Solution]    


 


polyethylene glycoL 3350 [Miralax] 17 gm PEG/G-TUBE DAILY@0630 02/26/21 04/08/22

 History


 


Gabapentin Oral Soln [Neurontin 200 mg PEG/G-TUBE TID@06,10,20 04/08/22 04/08/22

 History





Oral Soln]    








                                    Allergies











Allergy/AdvReac Type Severity Reaction Status Date / Time


 


amoxicillin Allergy  Rash/Hives Verified 07/25/22 09:15


 


Penicillins Allergy  Unknown Verified 07/25/22 09:15


 


risperidone [From Risperdal] AdvReac  Tardive Verified 07/25/22 09:15





   Dyskinesia  














Surgical - Exam


                                   Vital Signs











Temp Pulse Resp BP Pulse Ox


 


 98.5 F   124 H  34 H  173/117   97 


 


 07/25/22 00:06  07/25/22 00:06  07/25/22 00:06  07/25/22 00:06  07/25/22 00:06














- General





Patient is awake.  She is a phasic.  She is unable to communicate.  She appears 

short of breath.


chronically ill





- Eyes


PERRL, normal ocular movement





- ENT


normal pinna





- Neck


no masses





- Respiratory


normal expansion





- Cardiovascular


Rhythm: regular





- Abdomen





Distended.  There is no rebound or guarding.


Abdomen: soft





Results





- Labs





                                 07/25/22 00:26





                                 07/25/22 00:26


                  Abnormal Lab Results - Last 24 Hours (Table)











  07/25/22 07/25/22 07/25/22 Range/Units





  00:26 00:26 08:46 


 


WBC  15.6 H    (3.8-10.6)  k/uL


 


MCHC  30.3 L    (31.0-37.0)  g/dL


 


Neutrophils #  13.6 H    (1.3-7.7)  k/uL


 


BUN   24 H   (7-17)  mg/dL


 


Creatinine   0.43 L   (0.52-1.04)  mg/dL


 


Glucose   166 H   (74-99)  mg/dL


 


Plasma Lactic Acid Ubaldo    2.3 H*  (0.7-2.0)  mmol/L


 


Phosphorus   4.7 H   (2.5-4.5)  mg/dL








                                 Diabetes panel











  07/25/22 Range/Units





  00:26 


 


Sodium  139  (137-145)  mmol/L


 


Potassium  3.6  (3.5-5.1)  mmol/L


 


Chloride  107  ()  mmol/L


 


Carbon Dioxide  25  (22-30)  mmol/L


 


BUN  24 H  (7-17)  mg/dL


 


Creatinine  0.43 L  (0.52-1.04)  mg/dL


 


Glucose  166 H  (74-99)  mg/dL


 


Calcium  9.3  (8.4-10.2)  mg/dL


 


AST  24  (14-36)  U/L


 


ALT  14  (4-34)  U/L


 


Alkaline Phosphatase  112  ()  U/L


 


Total Protein  7.3  (6.3-8.2)  g/dL


 


Albumin  3.9  (3.5-5.0)  g/dL








                                  Calcium panel











  07/25/22 Range/Units





  00:26 


 


Calcium  9.3  (8.4-10.2)  mg/dL


 


Phosphorus  4.7 H  (2.5-4.5)  mg/dL


 


Albumin  3.9  (3.5-5.0)  g/dL








                                 Pituitary panel











  07/25/22 Range/Units





  00:26 


 


Sodium  139  (137-145)  mmol/L


 


Potassium  3.6  (3.5-5.1)  mmol/L


 


Chloride  107  ()  mmol/L


 


Carbon Dioxide  25  (22-30)  mmol/L


 


BUN  24 H  (7-17)  mg/dL


 


Creatinine  0.43 L  (0.52-1.04)  mg/dL


 


Glucose  166 H  (74-99)  mg/dL


 


Calcium  9.3  (8.4-10.2)  mg/dL








                                  Adrenal panel











  07/25/22 Range/Units





  00:26 


 


Sodium  139  (137-145)  mmol/L


 


Potassium  3.6  (3.5-5.1)  mmol/L


 


Chloride  107  ()  mmol/L


 


Carbon Dioxide  25  (22-30)  mmol/L


 


BUN  24 H  (7-17)  mg/dL


 


Creatinine  0.43 L  (0.52-1.04)  mg/dL


 


Glucose  166 H  (74-99)  mg/dL


 


Calcium  9.3  (8.4-10.2)  mg/dL


 


Total Bilirubin  0.4  (0.2-1.3)  mg/dL


 


AST  24  (14-36)  U/L


 


ALT  14  (4-34)  U/L


 


Alkaline Phosphatase  112  ()  U/L


 


Total Protein  7.3  (6.3-8.2)  g/dL


 


Albumin  3.9  (3.5-5.0)  g/dL














Assessment and Plan


Assessment: 





The patient was examined with a sister at the bedside (right epigastric she had 

abdominal pain.  The patient cannot get that she did not have pain.  She did 

complain of pain in her lower extremities.





The patient has a directive for DO NOT INTUBATE and DO NOT RESUSCITATE.  Patient

is an extremely poor surgical candidate.  I did discuss the sister that she 

could be observed.  The patient and family seem to be agreeable with 

observation.  I did warn the family and the patient that if her condition 

worsens she may require intubation.  However she does not wish to be intubated. 

At this point the patient be treated medically.  She'll be observed closely.








Past Medical History


Past Medical History: GERD/Reflux, Pneumonia


Additional Past Medical History / Comment(s): migraines, CEREBRAL PALSY. PEG 

TUBE, KIDNEY STONES, CONGENTIAL QUAD /TREMORS, PT IS TOTAL CARE IN W/C AND IS 

NON VERBAL. DOES UNDERSTAND AND FOLLOW VERBAL COMMUNICATION PER STAFF AT [facility].


History of Any Multi-Drug Resistant Organisms: None Reported


Past Surgical History: Appendectomy, Hysterectomy


Additional Past Surgical History / Comment(s): .


Past Anesthesia/Blood Transfusion Reactions: No Reported Reaction


Past Psychological History: No Psychological Hx Reported, Depression


Smoking Status: Never smoker


Past Alcohol Use History: None Reported


Past Drug Use History: None Reported





- Past Family History


  ** Father


Family Medical History: Asthma





  ** Mother


Family Medical History: COPD





Medications and Allergies


                                Home Medications











 Medication  Instructions  Recorded  Confirmed  Type


 


Dantrolene [Dantrium] 25 mg PEG/G-TUBE QID 05/19/14 07/25/22 History


 


diazePAM [Valium] 5 mg PEG/G-TUBE QID 05/20/14 07/25/22 History


 


Baclofen [Lioresal] 20 mg PEG/G-TUBE TID 04/25/17 07/25/22 History


 


diazePAM [Valium] 2 mg PEG/G-TUBE TID 04/25/17 07/25/22 History


 


Arctic Relief Gel  1 applic TOPICAL TID@1000,1630,2000 02/26/21 07/25/22 History


 


Citalopram Hydrobromide [CeleXA 20 ml PEG/G-TUBE DAILY 02/26/21 07/25/22 History





Oral Soln]    


 


Dicyclomine [Bentyl] 10 mg PEG/G-TUBE TID 02/26/21 07/25/22 History


 


Famotidine [Pepcid] 20 mg PEG/G-TUBE BID 02/26/21 07/25/22 History


 


Lactulose 10 gm PEG/G-TUBE BID 02/26/21 07/25/22 History


 


Melatonin 3 mg PEG/G-TUBE HS 02/26/21 07/25/22 History


 


OLANZapine 15 mg PEG/G-TUBE HS 02/26/21 07/25/22 History


 


Sennosides [Senna] 8.6 mg PEG/G-TUBE BID 02/26/21 07/25/22 History


 


Twocal Hn Liq 237 ml PEG/G-TUBE QID PRN 02/26/21 07/25/22 History


 


polyethylene glycoL 3350 [Miralax] 17 gm PEG/G-TUBE DAILY 02/26/21 07/25/22 

History


 


Gabapentin Oral Soln [Neurontin 200 mg PEG/G-TUBE TID 04/08/22 07/25/22 History





Oral Soln]    








                                    Allergies











Allergy/AdvReac Type Severity Reaction Status Date / Time


 


amoxicillin Allergy  Rash/Hives Verified 07/25/22 09:15


 


Penicillins Allergy  Unknown Verified 07/25/22 09:15


 


risperidone [From Risperdal] AdvReac  Tardive Verified 07/25/22 09:15





   Dyskinesia  














Surgical - Exam


                                   Vital Signs











Temp Pulse Resp BP Pulse Ox


 


 98.5 F   124 H  34 H  173/117   97 


 


 07/25/22 00:06  07/25/22 00:06  07/25/22 00:06  07/25/22 00:06  07/25/22 00:06














Results





- Labs





                                 07/25/22 00:26





                                 07/25/22 00:26


                  Abnormal Lab Results - Last 24 Hours (Table)











  07/25/22 07/25/22 07/25/22 Range/Units





  00:26 00:26 08:46 


 


WBC  15.6 H    (3.8-10.6)  k/uL


 


MCHC  30.3 L    (31.0-37.0)  g/dL


 


Neutrophils #  13.6 H    (1.3-7.7)  k/uL


 


BUN   24 H   (7-17)  mg/dL


 


Creatinine   0.43 L   (0.52-1.04)  mg/dL


 


Glucose   166 H   (74-99)  mg/dL


 


Plasma Lactic Acid Ubaldo    2.3 H*  (0.7-2.0)  mmol/L


 


Phosphorus   4.7 H   (2.5-4.5)  mg/dL








                                 Diabetes panel











  07/25/22 Range/Units





  00:26 


 


Sodium  139  (137-145)  mmol/L


 


Potassium  3.6  (3.5-5.1)  mmol/L


 


Chloride  107  ()  mmol/L


 


Carbon Dioxide  25  (22-30)  mmol/L


 


BUN  24 H  (7-17)  mg/dL


 


Creatinine  0.43 L  (0.52-1.04)  mg/dL


 


Glucose  166 H  (74-99)  mg/dL


 


Calcium  9.3  (8.4-10.2)  mg/dL


 


AST  24  (14-36)  U/L


 


ALT  14  (4-34)  U/L


 


Alkaline Phosphatase  112  ()  U/L


 


Total Protein  7.3  (6.3-8.2)  g/dL


 


Albumin  3.9  (3.5-5.0)  g/dL








                                  Calcium panel











  07/25/22 Range/Units





  00:26 


 


Calcium  9.3  (8.4-10.2)  mg/dL


 


Phosphorus  4.7 H  (2.5-4.5)  mg/dL


 


Albumin  3.9  (3.5-5.0)  g/dL








                                 Pituitary panel











  07/25/22 Range/Units





  00:26 


 


Sodium  139  (137-145)  mmol/L


 


Potassium  3.6  (3.5-5.1)  mmol/L


 


Chloride  107  ()  mmol/L


 


Carbon Dioxide  25  (22-30)  mmol/L


 


BUN  24 H  (7-17)  mg/dL


 


Creatinine  0.43 L  (0.52-1.04)  mg/dL


 


Glucose  166 H  (74-99)  mg/dL


 


Calcium  9.3  (8.4-10.2)  mg/dL








                                  Adrenal panel











  07/25/22 Range/Units





  00:26 


 


Sodium  139  (137-145)  mmol/L


 


Potassium  3.6  (3.5-5.1)  mmol/L


 


Chloride  107  ()  mmol/L


 


Carbon Dioxide  25  (22-30)  mmol/L


 


BUN  24 H  (7-17)  mg/dL


 


Creatinine  0.43 L  (0.52-1.04)  mg/dL


 


Glucose  166 H  (74-99)  mg/dL


 


Calcium  9.3  (8.4-10.2)  mg/dL


 


Total Bilirubin  0.4  (0.2-1.3)  mg/dL


 


AST  24  (14-36)  U/L


 


ALT  14  (4-34)  U/L


 


Alkaline Phosphatase  112  ()  U/L


 


Total Protein  7.3  (6.3-8.2)  g/dL


 


Albumin  3.9  (3.5-5.0)  g/dL

## 2022-07-25 NOTE — XR
EXAMINATION TYPE: XR chest 1V portable

 

DATE OF EXAM: 7/25/2022

 

COMPARISON: 4/8/2022

 

HISTORY: Chest pain

 

TECHNIQUE:

 

FINDINGS: There is markedly elevated right-sided diaphragm. There is double wall sign at the right up
per quadrant consistent with pneumoperitoneum. The hepatic flexure of the colon extends up to the rig
ht diaphragm.

 

Heart size is normal. No heart failure seen.

 

IMPRESSION: There is a pneumoperitoneum which appears new compared to old exams. Follow-up recommende
d. This exam was discussed with emergency room attending staff at 12:30 AM.

 

There is chronic elevation of the right diaphragm consistent with diaphragm paralysis and similar to 
old exam. No heart failure seen.

## 2022-07-25 NOTE — P.GSHP
History of Present Illness


H&P Date: 07/25/22


Chief Complaint: Shortness of breath





This a 57-year-old female with multiple medical problems.  The patient was 

brought him Min with shortness of breath.  She is a phasic.  She 

communicates to her sister via blinking.  The patient's has a DO NOT RESUSCITATE

DO NOT INTUBATE medical plan.  The patient underwent CAT scan is found have some

evidence of free air.  It is unclear of the etiology of free air.  Currently she

is not complaining of abdominal pain.





Past Medical History


Past Medical History: GERD/Reflux, Pneumonia


Additional Past Medical History / Comment(s): migraines, CEREBRAL PALSY. PEG 

TUBE, KIDNEY STONES, CONGENTIAL QUAD /TREMORS, PT IS TOTAL CARE IN W/C AND IS 

NON VERBAL. DOES UNDERSTAND AND FOLLOW VERBAL COMMUNICATION PER STAFF AT [facility].


History of Any Multi-Drug Resistant Organisms: None Reported


Past Surgical History: Appendectomy, Hysterectomy


Additional Past Surgical History / Comment(s): .


Past Anesthesia/Blood Transfusion Reactions: No Reported Reaction


Past Psychological History: No Psychological Hx Reported, Depression


Smoking Status: Never smoker


Past Alcohol Use History: None Reported


Past Drug Use History: None Reported





- Past Family History


  ** Father


Family Medical History: Asthma





  ** Mother


Family Medical History: COPD





Medications and Allergies


                                Home Medications











 Medication  Instructions  Recorded  Confirmed  Type


 


Dantrolene [Dantrium] 25 mg PEG/G-TUBE QID 05/19/14 04/08/22 History


 


Topiramate [Topamax] 100 mg PEG/G-TUBE HS@2000 05/19/14 04/08/22 History


 


diazePAM [Valium] 5 mg PEG/G-TUBE QID 05/20/14 04/08/22 History


 


Baclofen [Lioresal] 20 mg PEG/G-TUBE TID@0600,1000,2000 04/25/17 04/08/22 

History


 


diazePAM [Valium] 2 mg PEG/G-TUBE TID@0600,1630,2000 04/25/17 04/08/22 History


 


Acetaminophen Tab [Tylenol] 1,000 mg PEG/G-TUBE Q4H PRN 02/26/21 04/08/22 

History


 


Arctic Relief Gel  1 applic TOPICAL TID@1000,1630,2000 02/26/21 04/08/22 History


 


Azelastine HCl 2 spray EA NOSTRIL BID PRN 02/26/21 04/08/22 History


 


Citalopram Hydrobromide [CeleXA 20 ml PEG/G-TUBE DAILY 02/26/21 04/08/22 History





Oral Soln]    


 


Dicyclomine [Bentyl] 10 mg PEG/G-TUBE TID@0600,1000,2000 02/26/21 04/08/22 

History


 


Famotidine [Pepcid] 20 mg PEG/G-TUBE BID@0600,2000 02/26/21 04/08/22 History


 


HYDROcodone/APAP 5-325MG [Norco 1 tab PEG/G-TUBE Q8H PRN 02/26/21 04/08/22 

History





5-325]    


 


Ibuprofen [Motrin Ib] 200 mg PEG/G-TUBE Q6H PRN 02/26/21 04/08/22 History


 


Lactulose 10 - 20 gm PEG/G-TUBE AS DIRECTED 02/26/21 04/08/22 History





 PRN   


 


Loperamide [Imodium] 2 mg PO AS DIRECTED PRN 02/26/21 04/08/22 History


 


Magnesium Citrate 148 ml PEG/G-TUBE DAILY PRN 02/26/21 04/08/22 History


 


Melatonin 3 mg PEG/G-TUBE HS@2000 02/26/21 04/08/22 History


 


Mupirocin 2% Oint [Bactroban 2% 1 gm TOPICAL BID PRN 02/26/21 04/08/22 History





Oint]    


 


Na Phos,M-B/Na Phos,Di-Ba [Fleet 133 ml RECTAL DAILY PRN 02/26/21 04/08/22 

History





Adult]    


 


Nystatin 100,000Unit/gm Cream 1 applic TOPICAL BID PRN 02/26/21 04/08/22 History





[Mycostatin Cream]    


 


OLANZapine 15 mg PEG/G-TUBE HS@2000 02/26/21 04/08/22 History


 


Petrolatum, White [Aquaphor] 1 applic TOPICAL BID PRN MDD APPLY 02/26/21 04/08/22 History





 AROUND PEG TUBE   


 


Sennosides [Senna] 8.6 mg PEG/G-TUBE BID@0600,2000 02/26/21 04/08/22 History


 


Twocal Hn Liq 237 ml PEG/G-TUBE QID PRN 02/26/21 04/08/22 History


 


diphenhydrAMINE [Benadryl] 25 mg PO AS DIRECTED PRN 02/26/21 04/08/22 History


 


guaiFENesin [guaiFENesin Oral 1 dose PEG/G-TUBE AS DIRECTED PRN 02/26/21 0

4/08/22 History





Solution]    


 


polyethylene glycoL 3350 [Miralax] 17 gm PEG/G-TUBE DAILY@0630 02/26/21 04/08/22

 History


 


Gabapentin Oral Soln [Neurontin 200 mg PEG/G-TUBE TID@06,10,20 04/08/22 04/08/22

 History





Oral Soln]    








                                    Allergies











Allergy/AdvReac Type Severity Reaction Status Date / Time


 


amoxicillin Allergy  Rash/Hives Verified 07/25/22 09:15


 


Penicillins Allergy  Unknown Verified 07/25/22 09:15


 


risperidone [From Risperdal] AdvReac  Tardive Verified 07/25/22 09:15





   Dyskinesia  














Surgical - Exam


                                   Vital Signs











Temp Pulse Resp BP Pulse Ox


 


 98.5 F   124 H  34 H  173/117   97 


 


 07/25/22 00:06  07/25/22 00:06  07/25/22 00:06  07/25/22 00:06  07/25/22 00:06














- General





Patient is awake.  She is a phasic.  She is unable to communicate.  She appears 

short of breath.


chronically ill





- Eyes


PERRL, normal ocular movement





- ENT


normal pinna





- Neck


no masses





- Respiratory


normal expansion





- Cardiovascular


Rhythm: regular





- Abdomen





Distended.  There is no rebound or guarding.


Abdomen: soft





Results





- Labs





                                 07/25/22 00:26





                                 07/25/22 00:26


                  Abnormal Lab Results - Last 24 Hours (Table)











  07/25/22 07/25/22 07/25/22 Range/Units





  00:26 00:26 08:46 


 


WBC  15.6 H    (3.8-10.6)  k/uL


 


MCHC  30.3 L    (31.0-37.0)  g/dL


 


Neutrophils #  13.6 H    (1.3-7.7)  k/uL


 


BUN   24 H   (7-17)  mg/dL


 


Creatinine   0.43 L   (0.52-1.04)  mg/dL


 


Glucose   166 H   (74-99)  mg/dL


 


Plasma Lactic Acid Ubaldo    2.3 H*  (0.7-2.0)  mmol/L


 


Phosphorus   4.7 H   (2.5-4.5)  mg/dL








                                 Diabetes panel











  07/25/22 Range/Units





  00:26 


 


Sodium  139  (137-145)  mmol/L


 


Potassium  3.6  (3.5-5.1)  mmol/L


 


Chloride  107  ()  mmol/L


 


Carbon Dioxide  25  (22-30)  mmol/L


 


BUN  24 H  (7-17)  mg/dL


 


Creatinine  0.43 L  (0.52-1.04)  mg/dL


 


Glucose  166 H  (74-99)  mg/dL


 


Calcium  9.3  (8.4-10.2)  mg/dL


 


AST  24  (14-36)  U/L


 


ALT  14  (4-34)  U/L


 


Alkaline Phosphatase  112  ()  U/L


 


Total Protein  7.3  (6.3-8.2)  g/dL


 


Albumin  3.9  (3.5-5.0)  g/dL








                                  Calcium panel











  07/25/22 Range/Units





  00:26 


 


Calcium  9.3  (8.4-10.2)  mg/dL


 


Phosphorus  4.7 H  (2.5-4.5)  mg/dL


 


Albumin  3.9  (3.5-5.0)  g/dL








                                 Pituitary panel











  07/25/22 Range/Units





  00:26 


 


Sodium  139  (137-145)  mmol/L


 


Potassium  3.6  (3.5-5.1)  mmol/L


 


Chloride  107  ()  mmol/L


 


Carbon Dioxide  25  (22-30)  mmol/L


 


BUN  24 H  (7-17)  mg/dL


 


Creatinine  0.43 L  (0.52-1.04)  mg/dL


 


Glucose  166 H  (74-99)  mg/dL


 


Calcium  9.3  (8.4-10.2)  mg/dL








                                  Adrenal panel











  07/25/22 Range/Units





  00:26 


 


Sodium  139  (137-145)  mmol/L


 


Potassium  3.6  (3.5-5.1)  mmol/L


 


Chloride  107  ()  mmol/L


 


Carbon Dioxide  25  (22-30)  mmol/L


 


BUN  24 H  (7-17)  mg/dL


 


Creatinine  0.43 L  (0.52-1.04)  mg/dL


 


Glucose  166 H  (74-99)  mg/dL


 


Calcium  9.3  (8.4-10.2)  mg/dL


 


Total Bilirubin  0.4  (0.2-1.3)  mg/dL


 


AST  24  (14-36)  U/L


 


ALT  14  (4-34)  U/L


 


Alkaline Phosphatase  112  ()  U/L


 


Total Protein  7.3  (6.3-8.2)  g/dL


 


Albumin  3.9  (3.5-5.0)  g/dL














Assessment and Plan


Assessment: 





The patient was examined with a sister at the bedside (right epigastric she had 

abdominal pain.  The patient cannot get that she did not have pain.  She did 

complain of pain in her lower extremities.





The patient has a directive for DO NOT INTUBATE and DO NOT RESUSCITATE.  Patient

is an extremely poor surgical candidate.  I did discuss the sister that she 

could be observed.  The patient and family seem to be agreeable with 

observation.  I did warn the family and the patient that if her condition 

worsens she may require intubation.  However she does not wish to be intubated. 

At this point the patient be treated medically.  She'll be observed closely.

## 2022-07-25 NOTE — CT
EXAMINATION TYPE: CT ChestAbdPelvis w con

 

DATE OF EXAM: 7/25/2022

 

COMPARISON: CT abdomen 2/26/2021

 

HISTORY: SOB/ POSSIBLE ASPIRATION. PRIOR ON PACS

 

CT DLP: 791.3 mGycm

Automated exposure control for dose reduction was used.

 

CONTRAST: 

Performed with IV Contrast, patient injected with 100ml mL of Isovue 300.

Images obtained from the thoracic inlet to the floor of the pelvis with the IV contrast.

 

There is marked elevation of the right diaphragm. There is extensive infiltrate and atelectasis right
 lower lobe with multiple air bronchograms. There is interposition of the hepatic flexure of the colo
n which is anterior and superior to the liver. There is a pneumoperitoneum. Heart size is normal. The
re is dilated fluid-filled thoracic esophagus with fluid level in the upper thoracic esophagus. No ev
idence of mediastinal air. There is significant narrowing of the trachea that could relate to tracheo
malacia. There is mild narrowing of the left and right mainstem bronchi. No pneumothorax. There is so
me minimal infiltrate and atelectasis within the left lower lobe left paraspinal region.

 

Liver and spleen are intact. The stomach is intact. There is gastrostomy tube in the anterior stomach
. Tube appears in good position. There is no pancreatic mass.

The bile ducts are not dilated. Gallbladder appears normal.

 

There is no adrenal mass. Kidneys show satisfactory contrast opacification. There is bilateral hydron
ephrosis. There is bilateral mild hydroureter. Urinary bladder wall shows irregular thickening. There
 is no inguinal hernia. No free fluid in the pelvis. Delayed images show very little renal excretion.
 The thoracic and lumbar spine appear intact. No compression fracture.

 

There are multiple gas-distended loops of large bowel. There is gas down to the rectum. This is likel
y related to large bowel ileus. Small bowel is not dilated.

 

IMPRESSION:

There is a moderate pneumoperitoneum which is new compared to old exam. Source of the air is not vicky
r. There is a gastrostomy tube that appears in good position.

 

Dilated gas-filled large bowel consistent with ileus.

 

Chronic elevated right diaphragm may indicate diaphragm paralysis.

 

Infiltrate and atelectasis at the lung bases. Tracheal narrowing suggestive of tracheomalacia.

 

Esophageal fluid levels consistent with esophageal dysfunction. No esophageal mass seen.

 

There is moderate bilateral hydronephrosis with delayed images showing little renal excretion and thi
s is consistent with obstruction. Hydronephrosis is new compared to old exam. Irregular urinary bladd
er. Bladder mass is possible. Follow-up is recommended.

## 2022-07-25 NOTE — HP
HISTORY AND PHYSICAL



CHIEF COMPLAINT:

Shortness of breath and abdominal distention.



HISTORY OF PRESENT ILLNESS:

This is a 57-year-old woman with a past medical history of GERD, pneumonia, history of

multiple medical problems, and cerebral palsy has got PEG tube feeds.  The patient is

living in an AF home and apparently patient had shortness of breath and was brought to

[facility].  Aspiration was suspected.  Further evaluation showed significant

air free air under the diaphragm and significant ileus raising the possibility of

perforation, but however the patient apparently refused surgery per sister and

currently patient started on broad-spectrum IV antibiotics and monitored closely.



Details cannot be taken from the patient. The patient is stuporous.  Most history is

taken from my discussion with staff and review of the chart at this time.



PAST MEDICAL HISTORY:

History of cerebral palsy, pneumonia, multiple medical issues.



HOME MEDICATIONS:

Reviewed. They include lactulose, rest of medication doses are reviewed.



ALLERGIES:

NOTED INCLUDE AMOXICILLIN.



Family history and social history could not be taken.



REVIEW OF SYSTEMS:

Could not be taken.



PHYSICAL EXAM:

The patient is stuporous.

Pulse is 107, blood pressure 122/60.  Respirations 18.

HEENT:  Conjunctivae normal.  Oral mucosa moist.

NECK is no JVD.

CARDIOVASCULAR systems: S1, S2.

RESPIRATION: A few scattered rhonchi.

ABDOMEN:  Soft. Diffuse distention.  Some mild diffuse tenderness.  No ascites. Bowel

sounds diminished.

LEGS: No edema. No swelling.

NERVOUS SYSTEM: Contractures could not be examined.

SKIN: No ulcers.

JOINTS: No active deforming arthropathy.



LABS:

WBC 15.2.  Other labs are noted.



ASSESSMENT:

1. Abdominal distention with possible intestinal perforation.

2. Possible aspiration pneumonia.

3. Ileus.

4. Cerebral palsy.

5. Multiple medical issues.



RECOMMENDATIONS AND DISCUSSION:

In this 57 -year-old woman who presented with multiple complex medical issues, at this

time I recommend continue the current medications.  The patient will initiate broad-

spectrum IV antibiotics.  DVT prophylaxis.  Symptomatic treatment provided.  Pain

medications.  Ativan for sedation and agitation.  Closely follow with surgery.

Prognosis guarded.  Discussed with sister at the bedside and further recommendations to

follow.  See orders for details.  Discuss with staff.





MMODL / IJN: [id number] / Job#: [id number]

## 2022-07-25 NOTE — ED
Abdominal Pain HPI





- General


Chief Complaint: Shortness of Breath


Stated Complaint: aspiration


Time Seen by Provider: 07/25/22 00:14


Source: EMS, RN notes reviewed, old records reviewed


Mode of arrival: EMS


Limitations: language barrier, physical limitation





- History of Present Illness


Initial Comments: 





This is a 57-year-old female unable to communicate history patient's coming in 

for abdominal pain with fever that we are able to find.  Patient again can't 

provide any history patient has no travel history no significant sick contacts 

otherwise unknown symptoms.  History obtained by EMS and patient's staff patient

is a poor strain secondary to clinical condition


MD Complaint: abdominal pain


-: unknown


Location: diffuse


Radiation: none


Migration to: suprapubic


Severity: severe


Severity scale (1-10): 8


Quality: stabbing


Consistency: constant


Improves With: nothing


Worsens With: nothing


Context: other (0)


Treatments Prior to Arrival: other (0)





- Related Data


                                Home Medications











 Medication  Instructions  Recorded  Confirmed


 


Dantrolene [Dantrium] 25 mg PEG/G-TUBE QID 05/19/14 07/25/22


 


diazePAM [Valium] 5 mg PEG/G-TUBE QID 05/20/14 07/25/22


 


Baclofen [Lioresal] 20 mg PEG/G-TUBE TID 04/25/17 07/25/22


 


diazePAM [Valium] 2 mg PEG/G-TUBE TID 04/25/17 07/25/22


 


Arctic Relief Gel  1 applic TOPICAL TID@1000,1630,2000 02/26/21 07/25/22


 


Citalopram Hydrobromide [CeleXA 20 ml PEG/G-TUBE DAILY 02/26/21 07/25/22





Oral Soln]   


 


Dicyclomine [Bentyl] 10 mg PEG/G-TUBE TID 02/26/21 07/25/22


 


Famotidine [Pepcid] 20 mg PEG/G-TUBE BID 02/26/21 07/25/22


 


Lactulose 10 gm PEG/G-TUBE BID 02/26/21 07/25/22


 


Melatonin 3 mg PEG/G-TUBE HS 02/26/21 07/25/22


 


OLANZapine 15 mg PEG/G-TUBE HS 02/26/21 07/25/22


 


Sennosides [Senna] 8.6 mg PEG/G-TUBE BID 02/26/21 07/25/22


 


Twocal Hn Liq 237 ml PEG/G-TUBE QID PRN 02/26/21 07/25/22


 


polyethylene glycoL 3350 [Miralax] 17 gm PEG/G-TUBE DAILY 02/26/21 07/25/22


 


Gabapentin Oral Soln [Neurontin 200 mg PEG/G-TUBE TID 04/08/22 07/25/22





Oral Soln]   











                                    Allergies











Allergy/AdvReac Type Severity Reaction Status Date / Time


 


amoxicillin Allergy  Rash/Hives Verified 07/25/22 09:15


 


Penicillins Allergy  Unknown Verified 07/25/22 09:15


 


risperidone [From Risperdal] AdvReac  Tardive Verified 07/25/22 09:15





   Dyskinesia  














Review of Systems


ROS Statement: 


Those systems with pertinent positive or pertinent negative responses have been 

documented in the HPI.





ROS Other: All systems not noted in ROS Statement are negative.





Past Medical History


Past Medical History: GERD/Reflux, Pneumonia


Additional Past Medical History / Comment(s): migraines, CEREBRAL PALSY. PEG 

TUBE, KIDNEY STONES, CONGENTIAL QUAD /TREMORS, PT IS TOTAL CARE IN W/C AND IS 

NON VERBAL. DOES UNDERSTAND AND FOLLOW VERBAL COMMUNICATION PER STAFF AT [facility].


History of Any Multi-Drug Resistant Organisms: None Reported


Past Surgical History: Appendectomy, Hysterectomy


Additional Past Surgical History / Comment(s): .


Past Anesthesia/Blood Transfusion Reactions: No Reported Reaction


Past Psychological History: No Psychological Hx Reported, Depression


Smoking Status: Never smoker


Past Alcohol Use History: None Reported


Past Drug Use History: None Reported





- Past Family History


  ** Father


Family Medical History: Asthma





  ** Mother


Family Medical History: COPD





General Exam


Limitations: language barrier, physical limitation


General appearance: alert, in no apparent distress


Head exam: Present: atraumatic, normocephalic, normal inspection


Eye exam: Present: normal appearance, PERRL, EOMI.  Absent: scleral icterus, 

conjunctival injection, periorbital swelling


ENT exam: Present: normal exam, mucous membranes moist


Neck exam: Present: normal inspection.  Absent: tenderness, meningismus, 

lymphadenopathy


Respiratory exam: Present: normal lung sounds bilaterally.  Absent: respiratory 

distress, wheezes, rales, rhonchi, stridor


Cardiovascular Exam: Present: normal rhythm, tachycardia, normal heart sounds.  

Absent: systolic murmur, diastolic murmur, rubs, gallop, clicks


GI/Abdominal exam: Present: soft, normal bowel sounds.  Absent: distended, 

tenderness, guarding, rebound, rigid


Extremities exam: Present: normal inspection, full ROM, normal capillary refill.

 Absent: tenderness, pedal edema, joint swelling, calf tenderness


Back exam: Present: normal inspection


Neurological exam: Present: alert, oriented X3, CN II-XII intact


Psychiatric exam: Present: normal affect, normal mood


Skin exam: Present: warm, dry, intact, normal color.  Absent: rash





Course


                                   Vital Signs











  07/25/22 07/25/22 07/25/22





  00:06 00:19 00:30


 


Temperature 98.5 F  


 


Pulse Rate 124 H 124 H 


 


Pulse Rate [   





Pulse Oximetery   





]   


 


Respiratory 34 H  30 H





Rate   


 


Blood Pressure 173/117  


 


Blood Pressure   





[Left Calf]   


 


O2 Sat by Pulse 97  





Oximetry   














  07/25/22 07/25/22 07/25/22





  00:31 00:40 00:43


 


Temperature   


 


Pulse Rate 130 H 110 H 108 H


 


Pulse Rate [   





Pulse Oximetery   





]   


 


Respiratory  14 13





Rate   


 


Blood Pressure  162/124 125/96


 


Blood Pressure   





[Left Calf]   


 


O2 Sat by Pulse  96 96





Oximetry   














  07/25/22 07/25/22 07/25/22





  02:26 04:33 06:31


 


Temperature   


 


Pulse Rate 103 H 102 H 92


 


Pulse Rate [   





Pulse Oximetery   





]   


 


Respiratory 10 L 17 10 L





Rate   


 


Blood Pressure 123/79 143/92 102/88


 


Blood Pressure   





[Left Calf]   


 


O2 Sat by Pulse 94 L 95 96





Oximetry   














  07/25/22 07/25/22 07/25/22





  07:07 09:35 12:34


 


Temperature  98.3 F 


 


Pulse Rate 90 107 H 93


 


Pulse Rate [   





Pulse Oximetery   





]   


 


Respiratory 10 L 12 15





Rate   


 


Blood Pressure 104/89 122/66 105/87


 


Blood Pressure   





[Left Calf]   


 


O2 Sat by Pulse 96 94 L 97





Oximetry   














  07/25/22 07/25/22 07/25/22





  16:19 18:20 21:17


 


Temperature   


 


Pulse Rate 100 102 H 101 H


 


Pulse Rate [   





Pulse Oximetery   





]   


 


Respiratory 16 20 24





Rate   


 


Blood Pressure 90/40 143/104 101/85


 


Blood Pressure   





[Left Calf]   


 


O2 Sat by Pulse 97 95 96





Oximetry   














  07/25/22





  22:38


 


Temperature 99.8 F H


 


Pulse Rate 


 


Pulse Rate [ 107 H





Pulse Oximetery 





] 


 


Respiratory 17





Rate 


 


Blood Pressure 


 


Blood Pressure 106/62





[Left Calf] 


 


O2 Sat by Pulse 95





Oximetry 














- Reevaluation(s)


Reevaluation #1: 





07/25/22


Medical record is reviewed





Reevaluation #2: 





07/25/22


Patient informed results, difficult to communicate





Reevaluation #3: 





07/25/22


Patient remains stable,





- Consultations


Consultation #1: 





Spoke with  Admitting physicians okay for admission


Consultation #2: 





Spoke with  and we'll admit this patient as well





Medical Decision Making





- Lab Data


Result diagrams: 


                                 07/27/22 06:53





                                 07/28/22 03:26


                                   Lab Results











  07/25/22 07/25/22 07/25/22 Range/Units





  00:26 00:26 00:26 


 


WBC  15.6 H    (3.8-10.6)  k/uL


 


RBC  4.20    (3.80-5.40)  m/uL


 


Hgb  12.4    (11.4-16.0)  gm/dL


 


Hct  41.0    (34.0-46.0)  %


 


MCV  97.7    (80.0-100.0)  fL


 


MCH  29.6    (25.0-35.0)  pg


 


MCHC  30.3 L    (31.0-37.0)  g/dL


 


RDW  15.2    (11.5-15.5)  %


 


Plt Count  334    (150-450)  k/uL


 


MPV  9.8    


 


Neutrophils %  87    %


 


Lymphocytes %  7    %


 


Monocytes %  4    %


 


Eosinophils %  1    %


 


Basophils %  0    %


 


Neutrophils #  13.6 H    (1.3-7.7)  k/uL


 


Lymphocytes #  1.1    (1.0-4.8)  k/uL


 


Monocytes #  0.6    (0-1.0)  k/uL


 


Eosinophils #  0.1    (0-0.7)  k/uL


 


Basophils #  0.1    (0-0.2)  k/uL


 


Hypochromasia  Marked    


 


PT   9.9   (9.0-12.0)  sec


 


INR   0.9   (<1.2)  


 


APTT   24.1   (22.0-30.0)  sec


 


Sodium    139  (137-145)  mmol/L


 


Potassium    3.6  (3.5-5.1)  mmol/L


 


Chloride    107  ()  mmol/L


 


Carbon Dioxide    25  (22-30)  mmol/L


 


Anion Gap    7  mmol/L


 


BUN    24 H  (7-17)  mg/dL


 


Creatinine    0.43 L  (0.52-1.04)  mg/dL


 


Est GFR (CKD-EPI)AfAm    >90  (>60 ml/min/1.73 sqM)  


 


Est GFR (CKD-EPI)NonAf    >90  (>60 ml/min/1.73 sqM)  


 


Glucose    166 H  (74-99)  mg/dL


 


Plasma Lactic Acid Ubaldo     (0.7-2.0)  mmol/L


 


Calcium    9.3  (8.4-10.2)  mg/dL


 


Phosphorus    4.7 H  (2.5-4.5)  mg/dL


 


Magnesium    2.1  (1.6-2.3)  mg/dL


 


Total Bilirubin    0.4  (0.2-1.3)  mg/dL


 


AST    24  (14-36)  U/L


 


ALT    14  (4-34)  U/L


 


Alkaline Phosphatase    112  ()  U/L


 


Troponin I     (0.000-0.034)  ng/mL


 


NT-Pro-B Natriuret Pep     pg/mL


 


Total Protein    7.3  (6.3-8.2)  g/dL


 


Albumin    3.9  (3.5-5.0)  g/dL


 


Blood Type Confirm     














  07/25/22 07/25/22 07/25/22 Range/Units





  00:26 00:26 00:26 


 


WBC     (3.8-10.6)  k/uL


 


RBC     (3.80-5.40)  m/uL


 


Hgb     (11.4-16.0)  gm/dL


 


Hct     (34.0-46.0)  %


 


MCV     (80.0-100.0)  fL


 


MCH     (25.0-35.0)  pg


 


MCHC     (31.0-37.0)  g/dL


 


RDW     (11.5-15.5)  %


 


Plt Count     (150-450)  k/uL


 


MPV     


 


Neutrophils %     %


 


Lymphocytes %     %


 


Monocytes %     %


 


Eosinophils %     %


 


Basophils %     %


 


Neutrophils #     (1.3-7.7)  k/uL


 


Lymphocytes #     (1.0-4.8)  k/uL


 


Monocytes #     (0-1.0)  k/uL


 


Eosinophils #     (0-0.7)  k/uL


 


Basophils #     (0-0.2)  k/uL


 


Hypochromasia     


 


PT     (9.0-12.0)  sec


 


INR     (<1.2)  


 


APTT     (22.0-30.0)  sec


 


Sodium     (137-145)  mmol/L


 


Potassium     (3.5-5.1)  mmol/L


 


Chloride     ()  mmol/L


 


Carbon Dioxide     (22-30)  mmol/L


 


Anion Gap     mmol/L


 


BUN     (7-17)  mg/dL


 


Creatinine     (0.52-1.04)  mg/dL


 


Est GFR (CKD-EPI)AfAm     (>60 ml/min/1.73 sqM)  


 


Est GFR (CKD-EPI)NonAf     (>60 ml/min/1.73 sqM)  


 


Glucose     (74-99)  mg/dL


 


Plasma Lactic Acid Ubaldo  1.7    (0.7-2.0)  mmol/L


 


Calcium     (8.4-10.2)  mg/dL


 


Phosphorus     (2.5-4.5)  mg/dL


 


Magnesium     (1.6-2.3)  mg/dL


 


Total Bilirubin     (0.2-1.3)  mg/dL


 


AST     (14-36)  U/L


 


ALT     (4-34)  U/L


 


Alkaline Phosphatase     ()  U/L


 


Troponin I   <0.012   (0.000-0.034)  ng/mL


 


NT-Pro-B Natriuret Pep    221  pg/mL


 


Total Protein     (6.3-8.2)  g/dL


 


Albumin     (3.5-5.0)  g/dL


 


Blood Type Confirm     














  07/25/22 Range/Units





  00:26 


 


WBC   (3.8-10.6)  k/uL


 


RBC   (3.80-5.40)  m/uL


 


Hgb   (11.4-16.0)  gm/dL


 


Hct   (34.0-46.0)  %


 


MCV   (80.0-100.0)  fL


 


MCH   (25.0-35.0)  pg


 


MCHC   (31.0-37.0)  g/dL


 


RDW   (11.5-15.5)  %


 


Plt Count   (150-450)  k/uL


 


MPV   


 


Neutrophils %   %


 


Lymphocytes %   %


 


Monocytes %   %


 


Eosinophils %   %


 


Basophils %   %


 


Neutrophils #   (1.3-7.7)  k/uL


 


Lymphocytes #   (1.0-4.8)  k/uL


 


Monocytes #   (0-1.0)  k/uL


 


Eosinophils #   (0-0.7)  k/uL


 


Basophils #   (0-0.2)  k/uL


 


Hypochromasia   


 


PT   (9.0-12.0)  sec


 


INR   (<1.2)  


 


APTT   (22.0-30.0)  sec


 


Sodium   (137-145)  mmol/L


 


Potassium   (3.5-5.1)  mmol/L


 


Chloride   ()  mmol/L


 


Carbon Dioxide   (22-30)  mmol/L


 


Anion Gap   mmol/L


 


BUN   (7-17)  mg/dL


 


Creatinine   (0.52-1.04)  mg/dL


 


Est GFR (CKD-EPI)AfAm   (>60 ml/min/1.73 sqM)  


 


Est GFR (CKD-EPI)NonAf   (>60 ml/min/1.73 sqM)  


 


Glucose   (74-99)  mg/dL


 


Plasma Lactic Acid Ubaldo   (0.7-2.0)  mmol/L


 


Calcium   (8.4-10.2)  mg/dL


 


Phosphorus   (2.5-4.5)  mg/dL


 


Magnesium   (1.6-2.3)  mg/dL


 


Total Bilirubin   (0.2-1.3)  mg/dL


 


AST   (14-36)  U/L


 


ALT   (4-34)  U/L


 


Alkaline Phosphatase   ()  U/L


 


Troponin I   (0.000-0.034)  ng/mL


 


NT-Pro-B Natriuret Pep   pg/mL


 


Total Protein   (6.3-8.2)  g/dL


 


Albumin   (3.5-5.0)  g/dL


 


Blood Type Confirm  O Positive  














Critical Care Time


Critical Care Time: Yes


Total Critical Care Time: 31





Disposition


Clinical Impression: 


 Cerebral palsy, Aspiration pneumonia, Altered mental status, Bowel perforation





Disposition: ADMITTED AS IP TO THIS \[facility]\""


Condition: Serious


Is patient prescribed a controlled substance at d/c from ED?: No


Time of Disposition: 06:00

## 2022-07-26 LAB
ALBUMIN SERPL-MCNC: 3.5 G/DL (ref 3.8–4.9)
ALBUMIN/GLOB SERPL: 1.3 G/DL (ref 1.6–3.17)
ALP SERPL-CCNC: 81 U/L (ref 41–126)
ALT SERPL-CCNC: 11 U/L (ref 8–44)
ANION GAP SERPL CALC-SCNC: 7.7 MMOL/L (ref 10–18)
AST SERPL-CCNC: 16 U/L (ref 13–35)
BASOPHILS # BLD AUTO: 0.02 X 10*3/UL (ref 0–0.1)
BASOPHILS NFR BLD AUTO: 0.3 %
BUN SERPL-SCNC: 19.9 MG/DL (ref 9–27)
BUN/CREAT SERPL: 39.8 RATIO (ref 12–20)
CALCIUM SPEC-MCNC: 9.3 MG/DL (ref 8.7–10.3)
CHLORIDE SERPL-SCNC: 110 MMOL/L (ref 96–109)
CO2 SERPL-SCNC: 28.3 MMOL/L (ref 20–27.5)
EOSINOPHIL # BLD AUTO: 0.09 X 10*3/UL (ref 0.04–0.35)
EOSINOPHIL NFR BLD AUTO: 1.3 %
ERYTHROCYTE [DISTWIDTH] IN BLOOD BY AUTOMATED COUNT: 3.21 X 10*6/UL (ref 4.1–5.2)
ERYTHROCYTE [DISTWIDTH] IN BLOOD: 15.6 % (ref 11.5–14.5)
GLOBULIN SER CALC-MCNC: 2.7 G/DL (ref 1.6–3.3)
GLUCOSE BLD-MCNC: 111 MG/DL (ref 70–110)
GLUCOSE SERPL-MCNC: 92 MG/DL (ref 70–110)
HCT VFR BLD AUTO: 31.7 % (ref 37.2–46.3)
HGB BLD-MCNC: 9.2 G/DL (ref 12–15)
IMM GRANULOCYTES BLD QL AUTO: 0.4 %
LYMPHOCYTES # SPEC AUTO: 0.8 X 10*3/UL (ref 0.9–5)
LYMPHOCYTES NFR SPEC AUTO: 11.5 %
MAGNESIUM SPEC-SCNC: 2 MG/DL (ref 1.5–2.4)
MCH RBC QN AUTO: 28.7 PG (ref 27–32)
MCHC RBC AUTO-ENTMCNC: 29 G/DL (ref 32–37)
MCV RBC AUTO: 98.8 FL (ref 80–97)
MONOCYTES # BLD AUTO: 0.62 X 10*3/UL (ref 0.2–1)
MONOCYTES NFR BLD AUTO: 8.9 %
NEUTROPHILS # BLD AUTO: 5.38 X 10*3/UL (ref 1.8–7.7)
NEUTROPHILS NFR BLD AUTO: 77.6 %
NRBC BLD AUTO-RTO: 0 /100 WBCS (ref 0–0)
PLATELET # BLD AUTO: 204 X 10*3/UL (ref 140–440)
POTASSIUM SERPL-SCNC: 3.7 MMOL/L (ref 3.5–5.5)
PROT SERPL-MCNC: 6.2 G/DL (ref 6.2–8.2)
SODIUM SERPL-SCNC: 146 MMOL/L (ref 135–145)
WBC # BLD AUTO: 6.94 X 10*3/UL (ref 4.5–10)

## 2022-07-26 RX ADMIN — LEVOFLOXACIN SCH MLS/HR: 5 INJECTION, SOLUTION INTRAVENOUS at 00:01

## 2022-07-26 RX ADMIN — HYDROMORPHONE HYDROCHLORIDE PRN MG: 1 INJECTION, SOLUTION INTRAMUSCULAR; INTRAVENOUS; SUBCUTANEOUS at 20:26

## 2022-07-26 RX ADMIN — HYDROMORPHONE HYDROCHLORIDE PRN MG: 1 INJECTION, SOLUTION INTRAMUSCULAR; INTRAVENOUS; SUBCUTANEOUS at 06:51

## 2022-07-26 RX ADMIN — HYDROMORPHONE HYDROCHLORIDE PRN MG: 1 INJECTION, SOLUTION INTRAMUSCULAR; INTRAVENOUS; SUBCUTANEOUS at 17:31

## 2022-07-26 RX ADMIN — METRONIDAZOLE SCH MLS/HR: 500 INJECTION, SOLUTION INTRAVENOUS at 05:18

## 2022-07-26 RX ADMIN — HYDROMORPHONE HYDROCHLORIDE PRN MG: 1 INJECTION, SOLUTION INTRAMUSCULAR; INTRAVENOUS; SUBCUTANEOUS at 03:33

## 2022-07-26 RX ADMIN — PANTOPRAZOLE SODIUM SCH MG: 40 INJECTION, POWDER, FOR SOLUTION INTRAVENOUS at 20:26

## 2022-07-26 RX ADMIN — PANTOPRAZOLE SODIUM SCH MG: 40 INJECTION, POWDER, FOR SOLUTION INTRAVENOUS at 10:37

## 2022-07-26 RX ADMIN — HYDROMORPHONE HYDROCHLORIDE PRN MG: 1 INJECTION, SOLUTION INTRAMUSCULAR; INTRAVENOUS; SUBCUTANEOUS at 10:34

## 2022-07-26 RX ADMIN — HYDROMORPHONE HYDROCHLORIDE PRN MG: 1 INJECTION, SOLUTION INTRAMUSCULAR; INTRAVENOUS; SUBCUTANEOUS at 13:37

## 2022-07-26 RX ADMIN — CEFAZOLIN SCH: 330 INJECTION, POWDER, FOR SOLUTION INTRAMUSCULAR; INTRAVENOUS at 13:57

## 2022-07-26 RX ADMIN — HYDROMORPHONE HYDROCHLORIDE PRN MG: 1 INJECTION, SOLUTION INTRAMUSCULAR; INTRAVENOUS; SUBCUTANEOUS at 23:44

## 2022-07-26 RX ADMIN — LEVOFLOXACIN SCH MLS/HR: 5 INJECTION, SOLUTION INTRAVENOUS at 23:58

## 2022-07-26 RX ADMIN — METRONIDAZOLE SCH MLS/HR: 500 INJECTION, SOLUTION INTRAVENOUS at 20:23

## 2022-07-26 RX ADMIN — METRONIDAZOLE SCH MLS/HR: 500 INJECTION, SOLUTION INTRAVENOUS at 13:38

## 2022-07-26 RX ADMIN — IPRATROPIUM BROMIDE AND ALBUTEROL SULFATE SCH ML: .5; 3 SOLUTION RESPIRATORY (INHALATION) at 20:00

## 2022-07-26 NOTE — PN
PROGRESS NOTE



DATE OF SERVICE:

07/26/2022



This 57 -year-old woman was admitted with possible abdominal perforation and acute

respiratory failure is being closely monitored.  The patient apparently declined

surgery and the patient is on conservative line of management.  Patient on broad

spectrum IV antibiotics.  The patient is slightly thrashing around and the sister who

is the legal guardian is concerned about continuing the psych medications, but

currently the surgery wants the patient on strict n.p.o. because of concerns of

perforation.



PAST MEDICAL HISTORY:

Reviewed.



REVIEW OF SYMPTOMS:

Could not be taken.



CURRENT MEDICATIONS:

Reviewed and include: Flagyl and Levaquin.  Rest of medications noted.



PHYSICAL EXAMINATION:

Pulse is 114, blood pressure 89/50, respiration 20.

HEENT: Conjunctive normal. Neck: No JVD. Cardiovascular: S1, S2.  Respirations: A few

scattered rhonchi and crackles.

Abdomen:  Obese. Abdomen is mildly distended.  Mildly tender.  No guarding.  No

rigidity. Bowel sounds diminished. Legs: No edema. Nervous system could not be

examined.



LAB:

WBC 6.9, hemoglobin 9.6.  The rest of the labs are noted.



ASSESSMENT:

1. Acute abdomen with acute possible abdominal perforation present on admission.

2. Possible aspiration pneumonia.

3. Ileus.

4. Cerebral palsy.

5. Multiple medical issues.



RECOMMENDATIONS AND DISCUSSION:

In this 57 -year-old woman who presented with multiple medical problems, we will

monitor the patient closely.  We will continue with empiric antibiotics.  Otherwise,

follow closely with surgery.  We will use Ativan and Dilaudid for pain control and

agitation control.  Otherwise, repeat labs in the morning.  Prognosis guarded because

of multiple complex medical issues and discussed with the sister at length who

understands and agrees.  Further recommendations to follow. See orders for details. Add

bronchodilators.





MMODL / IJN: [id number] / Job#: [id number]

## 2022-07-26 NOTE — P.PN
Progress Note - Text


Progress Note Date: 07/26/22





Patient appears to be more comfortable today.  She appears less short of breath.

 She is unable to give any medical history.





On exam vital signs appear stable.  Abdomen is soft with no significant 

tenderness.





Pneumoperitoneum.  Unclear etiology.  Patient and family have made her 

nonoperative case.  Patient received supportive/Comfort Care.

## 2022-07-27 LAB
ANION GAP SERPL CALC-SCNC: 14.2 MMOL/L (ref 10–18)
BASOPHILS # BLD AUTO: 0.03 X 10*3/UL (ref 0–0.1)
BASOPHILS NFR BLD AUTO: 0.5 %
BUN SERPL-SCNC: 17.5 MG/DL (ref 9–27)
BUN/CREAT SERPL: 40.23 RATIO (ref 12–20)
CALCIUM SPEC-MCNC: 9.3 MG/DL (ref 8.7–10.3)
CHLORIDE SERPL-SCNC: 115 MMOL/L (ref 96–109)
CO2 SERPL-SCNC: 22.9 MMOL/L (ref 20–27.5)
EOSINOPHIL # BLD AUTO: 0.01 X 10*3/UL (ref 0.04–0.35)
EOSINOPHIL NFR BLD AUTO: 0.2 %
ERYTHROCYTE [DISTWIDTH] IN BLOOD BY AUTOMATED COUNT: 3.02 X 10*6/UL (ref 4.1–5.2)
ERYTHROCYTE [DISTWIDTH] IN BLOOD: 16 % (ref 11.5–14.5)
GLUCOSE SERPL-MCNC: 81 MG/DL (ref 70–110)
HCT VFR BLD AUTO: 30 % (ref 37.2–46.3)
HGB BLD-MCNC: 8.6 G/DL (ref 12–15)
IMM GRANULOCYTES BLD QL AUTO: 0.8 %
LYMPHOCYTES # SPEC AUTO: 0.71 X 10*3/UL (ref 0.9–5)
LYMPHOCYTES NFR SPEC AUTO: 11 %
MCH RBC QN AUTO: 28.5 PG (ref 27–32)
MCHC RBC AUTO-ENTMCNC: 28.7 G/DL (ref 32–37)
MCV RBC AUTO: 99.3 FL (ref 80–97)
MONOCYTES # BLD AUTO: 0.56 X 10*3/UL (ref 0.2–1)
MONOCYTES NFR BLD AUTO: 8.7 %
NEUTROPHILS # BLD AUTO: 5.1 X 10*3/UL (ref 1.8–7.7)
NEUTROPHILS NFR BLD AUTO: 78.8 %
NRBC BLD AUTO-RTO: 0 /100 WBCS (ref 0–0)
PLATELET # BLD AUTO: 231 X 10*3/UL (ref 140–440)
POTASSIUM SERPL-SCNC: 3.7 MMOL/L (ref 3.5–5.5)
SODIUM SERPL-SCNC: 152 MMOL/L (ref 135–145)
WBC # BLD AUTO: 6.46 X 10*3/UL (ref 4.5–10)

## 2022-07-27 RX ADMIN — PANTOPRAZOLE SODIUM SCH MG: 40 INJECTION, POWDER, FOR SOLUTION INTRAVENOUS at 21:53

## 2022-07-27 RX ADMIN — ACETAMINOPHEN PRN MLS/HR: 10 INJECTION, SOLUTION INTRAVENOUS at 23:26

## 2022-07-27 RX ADMIN — METRONIDAZOLE SCH MLS/HR: 500 INJECTION, SOLUTION INTRAVENOUS at 05:36

## 2022-07-27 RX ADMIN — HYDROMORPHONE HYDROCHLORIDE PRN MG: 1 INJECTION, SOLUTION INTRAMUSCULAR; INTRAVENOUS; SUBCUTANEOUS at 09:07

## 2022-07-27 RX ADMIN — HYDROMORPHONE HYDROCHLORIDE PRN MG: 1 INJECTION, SOLUTION INTRAMUSCULAR; INTRAVENOUS; SUBCUTANEOUS at 18:40

## 2022-07-27 RX ADMIN — CEFAZOLIN SCH MLS/HR: 330 INJECTION, POWDER, FOR SOLUTION INTRAMUSCULAR; INTRAVENOUS at 01:40

## 2022-07-27 RX ADMIN — IPRATROPIUM BROMIDE AND ALBUTEROL SULFATE SCH ML: .5; 3 SOLUTION RESPIRATORY (INHALATION) at 08:44

## 2022-07-27 RX ADMIN — DEXTROSE AND SODIUM CHLORIDE SCH MLS/HR: 5; .2 INJECTION, SOLUTION INTRAVENOUS at 15:22

## 2022-07-27 RX ADMIN — METRONIDAZOLE SCH MLS/HR: 500 INJECTION, SOLUTION INTRAVENOUS at 21:53

## 2022-07-27 RX ADMIN — ACETAMINOPHEN PRN MLS/HR: 10 INJECTION, SOLUTION INTRAVENOUS at 17:27

## 2022-07-27 RX ADMIN — PANTOPRAZOLE SODIUM SCH MG: 40 INJECTION, POWDER, FOR SOLUTION INTRAVENOUS at 07:51

## 2022-07-27 RX ADMIN — ACETAMINOPHEN PRN MLS/HR: 10 INJECTION, SOLUTION INTRAVENOUS at 11:13

## 2022-07-27 RX ADMIN — HYDROMORPHONE HYDROCHLORIDE PRN MG: 1 INJECTION, SOLUTION INTRAMUSCULAR; INTRAVENOUS; SUBCUTANEOUS at 15:22

## 2022-07-27 RX ADMIN — LEVOFLOXACIN SCH MLS/HR: 5 INJECTION, SOLUTION INTRAVENOUS at 23:20

## 2022-07-27 RX ADMIN — HYDROMORPHONE HYDROCHLORIDE PRN MG: 1 INJECTION, SOLUTION INTRAMUSCULAR; INTRAVENOUS; SUBCUTANEOUS at 05:36

## 2022-07-27 RX ADMIN — HYDROMORPHONE HYDROCHLORIDE PRN MG: 1 INJECTION, SOLUTION INTRAMUSCULAR; INTRAVENOUS; SUBCUTANEOUS at 12:12

## 2022-07-27 RX ADMIN — IPRATROPIUM BROMIDE AND ALBUTEROL SULFATE SCH ML: .5; 3 SOLUTION RESPIRATORY (INHALATION) at 15:22

## 2022-07-27 RX ADMIN — HYDROMORPHONE HYDROCHLORIDE PRN MG: 1 INJECTION, SOLUTION INTRAMUSCULAR; INTRAVENOUS; SUBCUTANEOUS at 02:35

## 2022-07-27 RX ADMIN — HYDROMORPHONE HYDROCHLORIDE PRN MG: 1 INJECTION, SOLUTION INTRAMUSCULAR; INTRAVENOUS; SUBCUTANEOUS at 21:52

## 2022-07-27 RX ADMIN — METRONIDAZOLE SCH MLS/HR: 500 INJECTION, SOLUTION INTRAVENOUS at 12:13

## 2022-07-27 RX ADMIN — IPRATROPIUM BROMIDE AND ALBUTEROL SULFATE SCH ML: .5; 3 SOLUTION RESPIRATORY (INHALATION) at 21:15

## 2022-07-27 NOTE — P.PN
Progress Note - Text


Progress Note Date: 07/27/22





Per the patient's family she's going to be made comfort care.  No surgical 

intervention is planned.  We will sign off.

## 2022-07-27 NOTE — CDI
Documentation Clarification Form



Date: 07/27/2022 09:46:32 AM

From: Salina Beaver CCS, CCDS

Phone: [phone]

MRN: [id number]

Admit Date: 07/25/2022 06:09:00 AM

Patient Name: Ginny Martinez

Visit Number: [id number]

Discharge Date:  





ATTENTION: The Clinical Documentation Specialists (CDI) and [facility] Coding Staff 
appreciate your assistance in clarifying documentation. Please respond to the 
clarification below the line at the bottom and electronically sign. The CDI & 
[facility] Coding staff will review the response and follow-up if needed. Please note: 
Queries are made part of the Legal Health Record. If you have any questions, 
please contact the author of this message via ITS.



Dr. Herman Sheet: 



The patient presented form an POLO with SOB and possible aspiration with a 
history of Cerebral Palsy. 



Based on this information and the findings below, is there an additional 
diagnosis that is clinically appropriate for this patient?



History/Risk Factors per the 7/25 Medical Management H/P: Cerebral Palsy, Has a 
PEG tube, GERD and  Pneumonia. 

Per the 7/25 ED Note History: Kidney Stones, Congenital Quad/Tremors, Total Care
in Wheelchair, Nonverbal. 



Clinical Indicators: Presented to the ED on 7/25 via EMS from an [facility] with 
Abdominal Pain and SOB, possible aspiration. 

Admit with Bowel Perforation

Per the 7/25 Medical Management H/P: Admit with Abdominal distention with 
possible intestinal perforation and Possible Aspiration Pneumonia. 



7/25 VS: T 98.5, P 124, R 34, 30 (sob, labored, accessory use, Tachypnea), BP 
173/117, PO 97 nrb, BMI: 25.2

7/25 LAB: WBC 15.6, Neutrophils 13.6; BUN 24, Creatinine 0.43, Glucose 166, 
Phosphorus 4.7.

7/25 CXR: There is a pneumoperitoneum which appears new compared to old exams. 
There is chronic elevation of the right diaphragm consistent with diaphragm 
paralysis and similar to old exam. No heart failure seen. 

7/25 CT Chest: Moderate pneumoperitoneum which is new compared to old exam. 
There is a Gastrostomy Tube. Dilated gas-filled large bowel consistent with 
Ileus. Chronic elevated right diaphragm may indicate diaphragm paralysis. 
Chronic elevated right diaphragm may indicate diaphragm paralysis. Infiltrate 
and atelectasis at the lung bases. Tracheal narrowing suggestive of 
tracheomalacia. Esophageal fluid levels consistent with esophageal dysfunction. 
Moderate bilateral hydronephrosis with delayed images showing little renal 
excretion and consistent with obstruction. Hydronephrosis, new. Bladder mass 
possible. 



Treatment 7/25: O2 2Lnc, IV Zofran 4 mg x1, IV Na Chl 1,000 mls @ 130 mla/hr 
q7H, IV Na Chl 500 mls @ 999 mls/hr q31M, INH Duoneb 3 ml x1, IV Dilaudid 1 mg 
x1, IV Dilaudid 1 mg q4H/prnIV Levaquin 100 mls @ 100 mls/hr x1, IV Ativan, IV 
Ativan 1 mg q4H/prn, IV Flagyl 100 mls @ 100 mls/hr x1, IV Protonix 40 mg x1, IV
Zofran 4 mg q8H/prn, IV Na Chl 1,000 mls @ 60 mls/hr q16H.  



Is there an additional diagnosis that is clinically appropriate for this 
patient?



[  ] Acute Hypoxic Respiratory Failure 

[  ] Acute on Chronic Hypoxic Respiratory Failure

[  ] Chronic Hypoxic Respiratory Failure

[  ] Acute Respiratory Insufficiency

[  ] Other Diagnosis, please specify: __________________

[  ] Unable to determine



(Template Last Revised: March 2021)

___________________________________________________________________________

Acute Hypoxic Respiratory Failure 

MTDD

## 2022-07-27 NOTE — P.PN
Subjective





This is a pleasant 57 years old female with past medical history of cerebral 

palsy, migraine, kidney stone, congenital quadriplegia, nonverbal at baseline, 

she was admitted with bowel perforation and evidence of pneumoperitoneum on 

chest x-ray since admission however patient is still doing well generally.  

Sister Mrs. Ngo at bedside and she confirms to me they don't want any surgery 

because she does not think her sister is a surgical candidate, and she knows 

that if she is not going to improve the going to go for comfort care.  Patient 

herself labs confused and her pain is controlled with Dilaudid.  Also she 

supposed to get IV Ativan but does not HELP her as per sister.  Blood pressure 

this morning is 118/58.  It was hypertensive overnight, the going to give bolus 

of normal saline 500 mL and change of fluids to D5 third-normal saline at 60 mL 

per hour because of hypernatremia.


Hemoglobin 8.6.  Sodium 152 preop patient is tachycardic.


Patient also receiving acetaminophen for fever.  She has a temperature of 101.8 

this morning.


She remains on Levaquin and ampicillin ALLERGY and IV Protonix 





Review of system: N/a


                               Active Medications











Generic Name Dose Route Start Last Admin





  Trade Name Freq  PRN Reason Stop Dose Admin


 


Albuterol/Ipratropium  3 ml  07/26/22 20:00  07/27/22 08:44





  Ipratropium-Albuterol 3 Ml Neb  INHALATION   3 ml





  RT-TID AZAM   Administration


 


Albuterol/Ipratropium  3 ml  07/26/22 13:06 





  Ipratropium-Albuterol 3 Ml Neb  INHALATION  





  RT-TID PRN  





  Shortness Of Breath Or Wheezing  


 


Hydromorphone HCl  1 mg  07/26/22 10:40  07/27/22 09:07





  Hydromorphone 1 Mg/Ml 1 Ml Syringe  IVP   1 mg





  Q3HR PRN   Administration





  Pain  


 


Levofloxacin 500 mg/ IV  100 mls @ 100 mls/hr  07/26/22 00:00  07/26/22 23:58





  Solution  IVPB   100 mls/hr





  Q24H AZAM   Administration





  Protocol  


 


Metronidazole 500 mg/ IV  100 mls @ 100 mls/hr  07/25/22 13:00  07/27/22 05:36





  Solution  IVPB   100 mls/hr





  Q8H AZAM   Administration





  Protocol  


 


Acetaminophen 1,000 mg/ IV  100 mls @ 400 mls/hr  07/27/22 10:51  07/27/22 11:13





  Solution  IVPB  07/28/22 05:14  400 mls/hr





  Q6H PRN   Administration





  Fever and/ or Pain  


 


Dextrose/Sodium Chloride  1,000 mls @ 75 mls/hr  07/27/22 11:15 





  Dextrose 5%-1/4ns Iv Soln  IV  





  [id number] AZAM  


 


Sodium Chloride  500 mls @ 999 mls/hr  07/27/22 11:17 





  Saline 0.9%  IV  07/27/22 11:47 





  .Q31M ONE  


 


Lorazepam  1 mg  07/26/22 14:16  07/27/22 11:14





  Lorazepam 2 Mg/Ml Inj  IV   1 mg





  Q3H PRN   Administration





  Anxiety  


 


Naloxone HCl  0.2 mg  07/25/22 06:08 





  Naloxone 0.4 Mg/Ml 1 Ml Vial  IV  





  Q2M PRN  





  Opioid Reversal  


 


Ondansetron HCl  4 mg  07/25/22 06:08 





  Ondansetron 4 Mg/2 Ml Vial  IVP  





  Q8HR PRN  





  Nausea And Vomiting  


 


Pantoprazole Sodium  40 mg  07/25/22 21:00  07/27/22 07:51





  Pantoprazole 40 Mg/10 Ml Vial  IVP   40 mg





  BID AZAM   Administration














Objective





- Vital Signs


Vital signs: 


                                   Vital Signs











Temp  101.8 F H  07/27/22 07:55


 


Pulse  112 H  07/27/22 09:03


 


Resp  19   07/27/22 07:55


 


BP  118/58   07/27/22 07:55


 


Pulse Ox  95   07/27/22 07:55


 


FiO2      








                                 Intake & Output











 07/26/22 07/27/22 07/27/22





 18:59 06:59 18:59


 


Output Total  100 


 


Balance  -100 


 


Weight  58.5 kg 


 


Output:   


 


  Urine  100 


 


Other:   


 


  Voiding Method Diaper External Catheter 


 


  # Voids 4  














- Exam





-GENERAL: The patient is confused, looks mildly agitated, nonverbal at baseline 

and have 4 extremity contractures


HEENT: Pupils are round and equally reacting to light. EOMI. No scleral icterus.

No conjunctival pallor. Normocephalic, atraumatic. No pharyngeal erythema. No 

thyromegaly. 


CARDIOVASCULAR: S1 and S2 present. No murmurs, rubs, or gallops. 


PULMONARY: Chest is clear to auscultation, no wheezing or crackles. 


-ABDOMEN: Soft,mild generalized abdominal tenderness with no rebound tenderness 

or guarding, nondistended, normoactive bowel sounds. No palpable organomegaly. 

PEG tube in place.  


MUSCULOSKELETAL: No joint swelling or deformity. 


EXTREMITIES: No cyanosis, clubbing, or pedal edema. 


NEUROLOGICAL: Gross neurological examination did not reveal any focal deficits. 


SKIN: No rashes. no petechiae.





- Labs


CBC & Chem 7: 


                                 07/27/22 06:53





                                 07/27/22 06:53


Labs: 


                  Abnormal Lab Results - Last 24 Hours (Table)











  07/27/22 Range/Units





  06:53 


 


RBC  3.02 L  (4.10-5.20)  X 10*6/uL


 


Hgb  8.6 L  (12.0-15.0)  g/dL


 


Hct  30.0 L  (37.2-46.3)  %


 


MCV  99.3 H  (80.0-97.0)  fL


 


MCHC  28.7 L  (32.0-37.0)  g/dL


 


RDW  16.0 H  (11.5-14.5)  %


 


MPV  12.4 H  (9.5-12.2)  fL


 


Immature Gran #  0.05 H  (0.00-0.04)  X 10*3/uL


 


Lymphocytes #  0.71 L  (0.90-5.00)  X 10*3/uL


 


Eosinophils #  0.01 L  (0.04-0.35)  X 10*3/uL














Assessment and Plan


Assessment: 





Bowel perforation with acute peritoneal


Hypernatremia


Sepsis secondary to above


Anemia


History of cerebral palsy, patient is nonverbal at baseline with contractures.


History of migraine.














Plan: 





This is a pleasant 57 years old female who presents with a bowel perforation


Sister who is legal guardian does not want surgical intervention


Continue with antibiotics Levaquin and IV Flagyl


Give normal saline bolus and change posterior D5 normal saline at 75 mL/h


Monitor sodium.


Cystoscopy was thoroughly guardian told me they don't want surgery if she is not

going to improve they would consider comfort care measure 


Labs and medication were reviewed..  Continue same treatment.  Continue with 

symptomatic treatment.  Resume home medication.  Monitor lytes and vitals.  DVT 

and GI prophylaxis.  Further recommendationsas per clinical course of the patie

nt


DVT prophylaxis: Subcutaneous heparin


GI Prophylaxis: Ppi


Prognosis is guarded and very poor

## 2022-07-28 VITALS — HEART RATE: 100 BPM

## 2022-07-28 VITALS — TEMPERATURE: 100.8 F

## 2022-07-28 VITALS — RESPIRATION RATE: 17 BRPM

## 2022-07-28 VITALS — SYSTOLIC BLOOD PRESSURE: 169 MMHG | DIASTOLIC BLOOD PRESSURE: 106 MMHG

## 2022-07-28 LAB
ANION GAP SERPL CALC-SCNC: 9.8 MMOL/L (ref 10–18)
BUN SERPL-SCNC: 13.6 MG/DL (ref 9–27)
BUN/CREAT SERPL: 27.59 RATIO (ref 12–20)
CALCIUM SPEC-MCNC: 9.1 MG/DL (ref 8.7–10.3)
CHLORIDE SERPL-SCNC: 116 MMOL/L (ref 96–109)
CO2 SERPL-SCNC: 25.7 MMOL/L (ref 20–27.5)
GLUCOSE SERPL-MCNC: 122 MG/DL (ref 70–110)
POTASSIUM SERPL-SCNC: 3.6 MMOL/L (ref 3.5–5.5)
SODIUM SERPL-SCNC: 151 MMOL/L (ref 135–145)

## 2022-07-28 RX ADMIN — IPRATROPIUM BROMIDE AND ALBUTEROL SULFATE SCH ML: .5; 3 SOLUTION RESPIRATORY (INHALATION) at 09:12

## 2022-07-28 RX ADMIN — PANTOPRAZOLE SODIUM SCH MG: 40 INJECTION, POWDER, FOR SOLUTION INTRAVENOUS at 07:23

## 2022-07-28 RX ADMIN — HYDROMORPHONE HYDROCHLORIDE PRN MG: 1 INJECTION, SOLUTION INTRAMUSCULAR; INTRAVENOUS; SUBCUTANEOUS at 07:24

## 2022-07-28 RX ADMIN — HYDROMORPHONE HYDROCHLORIDE PRN MG: 1 INJECTION, SOLUTION INTRAMUSCULAR; INTRAVENOUS; SUBCUTANEOUS at 01:04

## 2022-07-28 RX ADMIN — HYDROMORPHONE HYDROCHLORIDE PRN MG: 1 INJECTION, SOLUTION INTRAMUSCULAR; INTRAVENOUS; SUBCUTANEOUS at 10:54

## 2022-07-28 RX ADMIN — HYDROMORPHONE HYDROCHLORIDE PRN MG: 1 INJECTION, SOLUTION INTRAMUSCULAR; INTRAVENOUS; SUBCUTANEOUS at 03:56

## 2022-07-28 RX ADMIN — HYDROMORPHONE HYDROCHLORIDE PRN MG: 1 INJECTION, SOLUTION INTRAMUSCULAR; INTRAVENOUS; SUBCUTANEOUS at 14:30

## 2022-07-28 RX ADMIN — DEXTROSE AND SODIUM CHLORIDE SCH: 5; .2 INJECTION, SOLUTION INTRAVENOUS at 04:13

## 2022-07-28 RX ADMIN — METRONIDAZOLE SCH MLS/HR: 500 INJECTION, SOLUTION INTRAVENOUS at 13:09

## 2022-07-28 RX ADMIN — METRONIDAZOLE SCH MLS/HR: 500 INJECTION, SOLUTION INTRAVENOUS at 04:35

## 2022-07-28 RX ADMIN — IPRATROPIUM BROMIDE AND ALBUTEROL SULFATE SCH ML: .5; 3 SOLUTION RESPIRATORY (INHALATION) at 12:10

## 2022-07-28 NOTE — P.DS
Providers


Date of admission: 


07/25/22 06:09





Attending physician: 


Neda Rodriguez





Consults: 





                                        





07/25/22 06:08


Consult Physician Routine 


   Consulting Provider: Rizwan Robertson


   Consult Reason/Comments: bowelPerforation


   Do you want consulting provider notified?: Yes











Primary care physician: 


Rom Terry





Hospital Course: 








diagnoses:


Bowel perforation with acute peritoneal, legal guardian declined surgery 


end of life care , comfort care


Hypernatremia


Sepsis secondary to above


metabolic encephalopathy secondary to above


Anemia


History of cerebral palsy, patient is nonverbal at baseline with contractures.


History of migraine.








hospital course: 


This is a pleasant 57 years old female with past medical history of cerebral 

palsy, migraine, kidney stone, congenital quadriplegia, nonverbal at baseline, 

she was admitted with bowel perforation and evidence of pneumoperitoneum on 

chest x-ray since admission however patient is still doing well generally.  

Sister Mrs. Ngo at bedside and she confirms to me they don't want any surgery 

because she does not think her sister is a surgical candidate, and she knows 

that if she is not going to improve the going to go for comfort care. pt is 

evaluated by surgery team who again the pt legal guardian declined and refused 

to do any surgical intervention , instead the sister at bed side confirm to me 

that she has decided to go with hospice care and comfort care measure , pt does 

not look in distress, she has some secretion as per sister , which she is 

prescribed scopolamine patch


prognosis is very poor





Physical exam


-Gen: patient is a awake and confused , non verbal


CVS: S1-S2, RRR, no murmur


Lungs: B/L CTA, no wheezing


Abdomen: soft, no distention, no tenderness, positive bowel sounds


Extremity: no leg edema or induration





time spent is more than 35 min





Plan - Discharge Summary


Discharge Rx Participant: Yes


New Discharge Prescriptions: 


No Action


   RX: Dantrolene [Dantrium] 25 mg PEG/G-TUBE QID


   RX: diazePAM [Valium] 5 mg PEG/G-TUBE QID


   RX: diazePAM [Valium] 2 mg PEG/G-TUBE TID


   RX: Baclofen [Lioresal] 20 mg PEG/G-TUBE TID


   RX: Lactulose 10 gm PEG/G-TUBE BID


   RX: Sennosides [Senna] 8.6 mg PEG/G-TUBE BID


   RX: polyethylene glycoL 3350 [Miralax] 17 gm PEG/G-TUBE DAILY


   RX: OLANZapine 15 mg PEG/G-TUBE HS


   RX: Melatonin 3 mg PEG/G-TUBE HS


   RX: Famotidine [Pepcid] 20 mg PEG/G-TUBE BID


   RX: Dicyclomine [Bentyl] 10 mg PEG/G-TUBE TID


   RX: Citalopram Hydrobromide [CeleXA Oral Soln] 20 ml PEG/G-TUBE DAILY


   Twocal Hn Liq 237 ml PEG/G-TUBE QID PRN


     PRN Reason: IF FULL MEAL NOT EATEN


   Arctic Relief Gel  1 applic TOPICAL TID@1000,1630,2000


   RX: Gabapentin Oral Soln [Neurontin Oral Soln] 200 mg PEG/G-TUBE TID


Discharge Medication List





RX: Dantrolene [Dantrium] 25 mg PEG/G-TUBE QID 05/19/14 [History]


RX: diazePAM [Valium] 5 mg PEG/G-TUBE QID 05/20/14 [History]


RX: Baclofen [Lioresal] 20 mg PEG/G-TUBE TID 04/25/17 [History]


RX: diazePAM [Valium] 2 mg PEG/G-TUBE TID 04/25/17 [History]


Arctic Relief Gel  1 applic TOPICAL TID@1000,1630,2000 02/26/21 [History]


RX: Citalopram Hydrobromide [CeleXA Oral Soln] 20 ml PEG/G-TUBE DAILY 02/26/21 

[History]


RX: Dicyclomine [Bentyl] 10 mg PEG/G-TUBE TID 02/26/21 [History]


RX: Famotidine [Pepcid] 20 mg PEG/G-TUBE BID 02/26/21 [History]


RX: Lactulose 10 gm PEG/G-TUBE BID 02/26/21 [History]


RX: Melatonin 3 mg PEG/G-TUBE HS 02/26/21 [History]


RX: OLANZapine 15 mg PEG/G-TUBE HS 02/26/21 [History]


RX: Sennosides [Senna] 8.6 mg PEG/G-TUBE BID 02/26/21 [History]


RX: polyethylene glycoL 3350 [Miralax] 17 gm PEG/G-TUBE DAILY 02/26/21 [History]


Twocal Hn Liq 237 ml PEG/G-TUBE QID PRN 02/26/21 [History]


RX: Gabapentin Oral Soln [Neurontin Oral Soln] 200 mg PEG/G-TUBE TID 04/08/22 

[History]








Follow up Appointment(s)/Referral(s): 


Rom Terry MD [Primary Care Provider] - 1-2 days


Activity/Diet/Wound Care/Special Instructions: 


*Call [facility] ([phone]) to set up ambulance transport home at discharge to 

[address] [place] [address]. Form is in the front of the chart. 


Discharge Disposition: HOME WITH HOSPICE